# Patient Record
Sex: MALE | Race: BLACK OR AFRICAN AMERICAN | Employment: UNEMPLOYED | URBAN - METROPOLITAN AREA
[De-identification: names, ages, dates, MRNs, and addresses within clinical notes are randomized per-mention and may not be internally consistent; named-entity substitution may affect disease eponyms.]

---

## 2017-09-09 ENCOUNTER — HOSPITAL ENCOUNTER (EMERGENCY)
Age: 57
Discharge: HOME | End: 2017-09-09
Attending: EMERGENCY MEDICINE
Payer: MEDICARE

## 2017-09-09 ENCOUNTER — APPOINTMENT (OUTPATIENT)
Dept: GENERAL RADIOLOGY | Age: 57
End: 2017-09-09
Attending: EMERGENCY MEDICINE
Payer: MEDICARE

## 2017-09-09 VITALS
RESPIRATION RATE: 16 BRPM | OXYGEN SATURATION: 98 % | HEIGHT: 73 IN | DIASTOLIC BLOOD PRESSURE: 81 MMHG | TEMPERATURE: 98 F | BODY MASS INDEX: 23.59 KG/M2 | WEIGHT: 178 LBS | HEART RATE: 67 BPM | SYSTOLIC BLOOD PRESSURE: 132 MMHG

## 2017-09-09 DIAGNOSIS — L08.9 TOE INFECTION: Primary | ICD-10-CM

## 2017-09-09 LAB
ALBUMIN SERPL-MCNC: 3.4 G/DL (ref 3.5–5)
ALBUMIN/GLOB SERPL: 0.6 {RATIO} (ref 1.2–3.5)
ALP SERPL-CCNC: 100 U/L (ref 50–136)
ALT SERPL-CCNC: 16 U/L (ref 12–65)
ANION GAP SERPL CALC-SCNC: 7 MMOL/L (ref 7–16)
AST SERPL-CCNC: 23 U/L (ref 15–37)
BASOPHILS # BLD: 0 K/UL (ref 0–0.2)
BASOPHILS NFR BLD: 0 % (ref 0–2)
BILIRUB SERPL-MCNC: 0.6 MG/DL (ref 0.2–1.1)
BUN SERPL-MCNC: 12 MG/DL (ref 6–23)
CALCIUM SERPL-MCNC: 9.3 MG/DL (ref 8.3–10.4)
CHLORIDE SERPL-SCNC: 102 MMOL/L (ref 98–107)
CO2 SERPL-SCNC: 34 MMOL/L (ref 21–32)
CREAT SERPL-MCNC: 0.92 MG/DL (ref 0.8–1.5)
DIFFERENTIAL METHOD BLD: ABNORMAL
EOSINOPHIL # BLD: 0.1 K/UL (ref 0–0.8)
EOSINOPHIL NFR BLD: 1 % (ref 0.5–7.8)
ERYTHROCYTE [DISTWIDTH] IN BLOOD BY AUTOMATED COUNT: 14.7 % (ref 11.9–14.6)
GLOBULIN SER CALC-MCNC: 5.3 G/DL (ref 2.3–3.5)
GLUCOSE SERPL-MCNC: 109 MG/DL (ref 65–100)
HCT VFR BLD AUTO: 50.5 % (ref 41.1–50.3)
HGB BLD-MCNC: 17.1 G/DL (ref 13.6–17.2)
IMM GRANULOCYTES # BLD: 0 K/UL (ref 0–0.5)
IMM GRANULOCYTES NFR BLD: 0.2 % (ref 0–5)
LACTATE BLD-SCNC: 0.9 MMOL/L (ref 0.5–1.9)
LYMPHOCYTES # BLD: 1.9 K/UL (ref 0.5–4.6)
LYMPHOCYTES NFR BLD: 20 % (ref 13–44)
MCH RBC QN AUTO: 31.5 PG (ref 26.1–32.9)
MCHC RBC AUTO-ENTMCNC: 33.9 G/DL (ref 31.4–35)
MCV RBC AUTO: 93 FL (ref 79.6–97.8)
MONOCYTES # BLD: 0.8 K/UL (ref 0.1–1.3)
MONOCYTES NFR BLD: 8 % (ref 4–12)
NEUTS SEG # BLD: 6.8 K/UL (ref 1.7–8.2)
NEUTS SEG NFR BLD: 71 % (ref 43–78)
PLATELET # BLD AUTO: 277 K/UL (ref 150–450)
PMV BLD AUTO: 9.5 FL (ref 10.8–14.1)
POTASSIUM SERPL-SCNC: 4.2 MMOL/L (ref 3.5–5.1)
PROT SERPL-MCNC: 8.7 G/DL (ref 6.3–8.2)
RBC # BLD AUTO: 5.43 M/UL (ref 4.23–5.67)
SODIUM SERPL-SCNC: 143 MMOL/L (ref 136–145)
WBC # BLD AUTO: 9.6 K/UL (ref 4.3–11.1)

## 2017-09-09 PROCEDURE — 73660 X-RAY EXAM OF TOE(S): CPT

## 2017-09-09 PROCEDURE — 74011000258 HC RX REV CODE- 258: Performed by: EMERGENCY MEDICINE

## 2017-09-09 PROCEDURE — 74011250637 HC RX REV CODE- 250/637: Performed by: EMERGENCY MEDICINE

## 2017-09-09 PROCEDURE — 99284 EMERGENCY DEPT VISIT MOD MDM: CPT | Performed by: EMERGENCY MEDICINE

## 2017-09-09 PROCEDURE — 74011250636 HC RX REV CODE- 250/636: Performed by: EMERGENCY MEDICINE

## 2017-09-09 PROCEDURE — 74011000250 HC RX REV CODE- 250: Performed by: EMERGENCY MEDICINE

## 2017-09-09 PROCEDURE — 83605 ASSAY OF LACTIC ACID: CPT

## 2017-09-09 PROCEDURE — 80053 COMPREHEN METABOLIC PANEL: CPT | Performed by: EMERGENCY MEDICINE

## 2017-09-09 PROCEDURE — 85025 COMPLETE CBC W/AUTO DIFF WBC: CPT | Performed by: EMERGENCY MEDICINE

## 2017-09-09 PROCEDURE — 96365 THER/PROPH/DIAG IV INF INIT: CPT | Performed by: EMERGENCY MEDICINE

## 2017-09-09 RX ORDER — DICLOFENAC SODIUM 50 MG/1
50 TABLET, DELAYED RELEASE ORAL 2 TIMES DAILY
Qty: 14 TAB | Refills: 0 | Status: SHIPPED | OUTPATIENT
Start: 2017-09-09 | End: 2017-09-23

## 2017-09-09 RX ORDER — CLINDAMYCIN PHOSPHATE 600 MG/50ML
600 INJECTION INTRAVENOUS
Status: DISCONTINUED | OUTPATIENT
Start: 2017-09-09 | End: 2017-09-09 | Stop reason: SDUPTHER

## 2017-09-09 RX ORDER — HYDROCODONE BITARTRATE AND ACETAMINOPHEN 5; 325 MG/1; MG/1
1 TABLET ORAL ONCE
Status: COMPLETED | OUTPATIENT
Start: 2017-09-09 | End: 2017-09-09

## 2017-09-09 RX ORDER — CLINDAMYCIN HYDROCHLORIDE 150 MG/1
300 CAPSULE ORAL 3 TIMES DAILY
Qty: 42 CAP | Refills: 0 | Status: SHIPPED | OUTPATIENT
Start: 2017-09-09 | End: 2017-09-16

## 2017-09-09 RX ADMIN — SODIUM CHLORIDE 600 MG: 900 INJECTION, SOLUTION INTRAVENOUS at 12:32

## 2017-09-09 RX ADMIN — SODIUM CHLORIDE 1000 ML: 900 INJECTION, SOLUTION INTRAVENOUS at 12:31

## 2017-09-09 RX ADMIN — HYDROCODONE BITARTRATE AND ACETAMINOPHEN 1 TABLET: 5; 325 TABLET ORAL at 14:33

## 2017-09-09 NOTE — ED PROVIDER NOTES
HPI Comments: 50-year-old male with a history of pain in his left big toe that has gotten significantly worse over the last several days. Patient says it aches and is very uncomfortable. Patient says he has no known history of diabetes and does not take any medicines for anything. Patient says he did not have any specific injury to his toe. He denies any fevers or chills. Overall he rates his pain as an 8 out of 10. Elements of this note were created using speech recognition software. As such, errors of speech recognition may be present. Patient is a 55 y.o. male presenting with toe pain. The history is provided by the patient. Toe Pain           No past medical history on file. No past surgical history on file. No family history on file. Social History     Social History    Marital status: SINGLE     Spouse name: N/A    Number of children: N/A    Years of education: N/A     Occupational History    Not on file. Social History Main Topics    Smoking status: Not on file    Smokeless tobacco: Not on file    Alcohol use Not on file    Drug use: Not on file    Sexual activity: Not on file     Other Topics Concern    Not on file     Social History Narrative         ALLERGIES: Review of patient's allergies indicates no known allergies. Review of Systems   Constitutional: Negative for chills and fever. Respiratory: Negative. Cardiovascular: Negative. Gastrointestinal: Negative. Musculoskeletal: Positive for arthralgias and joint swelling. Skin: Positive for color change and wound. Vitals:    09/09/17 1156   BP: (!) 119/97   Pulse: (!) 116   Resp: 20   Temp: 97.7 °F (36.5 °C)   SpO2: 98%   Weight: 80.7 kg (178 lb)   Height: 6' 1\" (1.854 m)            Physical Exam   Constitutional: He is oriented to person, place, and time. He appears well-developed and well-nourished. Cardiovascular: Regular rhythm.     Mild tachycardia   Pulmonary/Chest: Effort normal and breath sounds normal.   Musculoskeletal:   Tenderness and swelling with erythema and induration to his left big toe   Neurological: He is alert and oriented to person, place, and time. Nursing note and vitals reviewed. MDM  Number of Diagnoses or Management Options  Diagnosis management comments: I attempted to drain a paronychia around his great toe. I did not get any significant pus release. Does have what appears to be some necrosis of the toe. Patient's blood work and x-ray were unremarkable. I will treat him with some antibiotics and encourage him to follow up with his primary care doctor he says is [facility].     ED Course       Procedures

## 2017-09-09 NOTE — ED NOTES
Patient reports swelling and pain to left great toe underside, patient reports this has gotten worse since last week and is having difficulty walking on his left foot. Also pain and warmth to the dorsal foot.

## 2017-09-09 NOTE — DISCHARGE INSTRUCTIONS
Return with any fevers, increased swelling, worsening symptoms, or additional concerns. Follow up with the wound care center for further evaluation.

## 2017-09-09 NOTE — ED TRIAGE NOTES
Patient reports left big toe pain x1 week. Under toe there is discoloration and swelling. Patient denies stepping on anything and no injury noted. Patient has increased sweating, but is afebrile. Patient also reports blisters on hands that have appeared since toe pain began.

## 2017-09-18 ENCOUNTER — APPOINTMENT (OUTPATIENT)
Dept: GENERAL RADIOLOGY | Age: 57
DRG: 580 | End: 2017-09-18
Attending: EMERGENCY MEDICINE
Payer: MEDICARE

## 2017-09-18 ENCOUNTER — HOSPITAL ENCOUNTER (INPATIENT)
Age: 57
LOS: 5 days | Discharge: HOME | DRG: 580 | End: 2017-09-23
Attending: EMERGENCY MEDICINE | Admitting: INTERNAL MEDICINE
Payer: MEDICARE

## 2017-09-18 DIAGNOSIS — L03.032 CELLULITIS OF TOE OF LEFT FOOT: Primary | ICD-10-CM

## 2017-09-18 PROBLEM — L08.9 INFECTION OF TOE: Status: ACTIVE | Noted: 2017-09-18

## 2017-09-18 LAB
ALBUMIN SERPL-MCNC: 3.3 G/DL (ref 3.5–5)
ALBUMIN/GLOB SERPL: 0.6 {RATIO} (ref 1.2–3.5)
ALP SERPL-CCNC: 114 U/L (ref 50–136)
ALT SERPL-CCNC: 15 U/L (ref 12–65)
ANION GAP SERPL CALC-SCNC: 10 MMOL/L (ref 7–16)
AST SERPL-CCNC: 11 U/L (ref 15–37)
BASOPHILS # BLD: 0 K/UL (ref 0–0.2)
BASOPHILS NFR BLD: 0 % (ref 0–2)
BILIRUB SERPL-MCNC: 0.6 MG/DL (ref 0.2–1.1)
BUN SERPL-MCNC: 12 MG/DL (ref 6–23)
CALCIUM SERPL-MCNC: 10 MG/DL (ref 8.3–10.4)
CHLORIDE SERPL-SCNC: 103 MMOL/L (ref 98–107)
CO2 SERPL-SCNC: 28 MMOL/L (ref 21–32)
CREAT SERPL-MCNC: 0.89 MG/DL (ref 0.8–1.5)
DIFFERENTIAL METHOD BLD: ABNORMAL
EOSINOPHIL # BLD: 0.1 K/UL (ref 0–0.8)
EOSINOPHIL NFR BLD: 1 % (ref 0.5–7.8)
ERYTHROCYTE [DISTWIDTH] IN BLOOD BY AUTOMATED COUNT: 14 % (ref 11.9–14.6)
GLOBULIN SER CALC-MCNC: 5.7 G/DL (ref 2.3–3.5)
GLUCOSE SERPL-MCNC: 86 MG/DL (ref 65–100)
HCT VFR BLD AUTO: 48.1 % (ref 41.1–50.3)
HGB BLD-MCNC: 16.9 G/DL (ref 13.6–17.2)
IMM GRANULOCYTES # BLD: 0 K/UL (ref 0–0.5)
IMM GRANULOCYTES NFR BLD: 0.2 % (ref 0–5)
LACTATE BLD-SCNC: 1.1 MMOL/L (ref 0.5–1.9)
LYMPHOCYTES # BLD: 1.4 K/UL (ref 0.5–4.6)
LYMPHOCYTES NFR BLD: 16 % (ref 13–44)
MAGNESIUM SERPL-MCNC: 2.1 MG/DL (ref 1.8–2.4)
MCH RBC QN AUTO: 31.1 PG (ref 26.1–32.9)
MCHC RBC AUTO-ENTMCNC: 35.1 G/DL (ref 31.4–35)
MCV RBC AUTO: 88.6 FL (ref 79.6–97.8)
MONOCYTES # BLD: 0.6 K/UL (ref 0.1–1.3)
MONOCYTES NFR BLD: 7 % (ref 4–12)
NEUTS SEG # BLD: 6.6 K/UL (ref 1.7–8.2)
NEUTS SEG NFR BLD: 76 % (ref 43–78)
PLATELET # BLD AUTO: 341 K/UL (ref 150–450)
PMV BLD AUTO: 9.3 FL (ref 10.8–14.1)
POTASSIUM SERPL-SCNC: 3.9 MMOL/L (ref 3.5–5.1)
PROCALCITONIN SERPL-MCNC: 0.1 NG/ML
PROT SERPL-MCNC: 9 G/DL (ref 6.3–8.2)
RBC # BLD AUTO: 5.43 M/UL (ref 4.23–5.67)
SODIUM SERPL-SCNC: 141 MMOL/L (ref 136–145)
WBC # BLD AUTO: 8.8 K/UL (ref 4.3–11.1)

## 2017-09-18 PROCEDURE — 96374 THER/PROPH/DIAG INJ IV PUSH: CPT | Performed by: EMERGENCY MEDICINE

## 2017-09-18 PROCEDURE — 73660 X-RAY EXAM OF TOE(S): CPT

## 2017-09-18 PROCEDURE — 65270000029 HC RM PRIVATE

## 2017-09-18 PROCEDURE — 99284 EMERGENCY DEPT VISIT MOD MDM: CPT | Performed by: EMERGENCY MEDICINE

## 2017-09-18 PROCEDURE — 74011250637 HC RX REV CODE- 250/637: Performed by: INTERNAL MEDICINE

## 2017-09-18 PROCEDURE — 74011000258 HC RX REV CODE- 258: Performed by: INTERNAL MEDICINE

## 2017-09-18 PROCEDURE — 74011000302 HC RX REV CODE- 302: Performed by: INTERNAL MEDICINE

## 2017-09-18 PROCEDURE — 74011250636 HC RX REV CODE- 250/636: Performed by: INTERNAL MEDICINE

## 2017-09-18 PROCEDURE — 85025 COMPLETE CBC W/AUTO DIFF WBC: CPT | Performed by: EMERGENCY MEDICINE

## 2017-09-18 PROCEDURE — 84145 PROCALCITONIN (PCT): CPT | Performed by: EMERGENCY MEDICINE

## 2017-09-18 PROCEDURE — 74011250636 HC RX REV CODE- 250/636: Performed by: EMERGENCY MEDICINE

## 2017-09-18 PROCEDURE — 86580 TB INTRADERMAL TEST: CPT | Performed by: INTERNAL MEDICINE

## 2017-09-18 PROCEDURE — 87040 BLOOD CULTURE FOR BACTERIA: CPT | Performed by: EMERGENCY MEDICINE

## 2017-09-18 PROCEDURE — 83605 ASSAY OF LACTIC ACID: CPT

## 2017-09-18 PROCEDURE — 83735 ASSAY OF MAGNESIUM: CPT | Performed by: EMERGENCY MEDICINE

## 2017-09-18 PROCEDURE — 80053 COMPREHEN METABOLIC PANEL: CPT | Performed by: EMERGENCY MEDICINE

## 2017-09-18 PROCEDURE — 96375 TX/PRO/DX INJ NEW DRUG ADDON: CPT | Performed by: EMERGENCY MEDICINE

## 2017-09-18 RX ORDER — NALOXONE HYDROCHLORIDE 0.4 MG/ML
0.4 INJECTION, SOLUTION INTRAMUSCULAR; INTRAVENOUS; SUBCUTANEOUS AS NEEDED
Status: DISCONTINUED | OUTPATIENT
Start: 2017-09-18 | End: 2017-09-23 | Stop reason: HOSPADM

## 2017-09-18 RX ORDER — ACETAMINOPHEN 325 MG/1
650 TABLET ORAL
Status: DISCONTINUED | OUTPATIENT
Start: 2017-09-18 | End: 2017-09-23 | Stop reason: HOSPADM

## 2017-09-18 RX ORDER — VANCOMYCIN HYDROCHLORIDE
1250 EVERY 12 HOURS
Status: DISCONTINUED | OUTPATIENT
Start: 2017-09-19 | End: 2017-09-23

## 2017-09-18 RX ORDER — ENOXAPARIN SODIUM 100 MG/ML
40 INJECTION SUBCUTANEOUS EVERY 24 HOURS
Status: DISCONTINUED | OUTPATIENT
Start: 2017-09-18 | End: 2017-09-18 | Stop reason: SDUPTHER

## 2017-09-18 RX ORDER — HYDROCODONE BITARTRATE AND ACETAMINOPHEN 7.5; 325 MG/1; MG/1
1 TABLET ORAL
Status: DISCONTINUED | OUTPATIENT
Start: 2017-09-18 | End: 2017-09-23

## 2017-09-18 RX ORDER — ONDANSETRON 2 MG/ML
4 INJECTION INTRAMUSCULAR; INTRAVENOUS
Status: DISCONTINUED | OUTPATIENT
Start: 2017-09-18 | End: 2017-09-23 | Stop reason: HOSPADM

## 2017-09-18 RX ORDER — BISACODYL 5 MG
5 TABLET, DELAYED RELEASE (ENTERIC COATED) ORAL DAILY PRN
Status: DISCONTINUED | OUTPATIENT
Start: 2017-09-18 | End: 2017-09-23 | Stop reason: HOSPADM

## 2017-09-18 RX ORDER — DIPHENHYDRAMINE HYDROCHLORIDE 50 MG/ML
12.5 INJECTION, SOLUTION INTRAMUSCULAR; INTRAVENOUS
Status: DISCONTINUED | OUTPATIENT
Start: 2017-09-18 | End: 2017-09-23 | Stop reason: HOSPADM

## 2017-09-18 RX ORDER — SODIUM CHLORIDE 0.9 % (FLUSH) 0.9 %
5-10 SYRINGE (ML) INJECTION AS NEEDED
Status: DISCONTINUED | OUTPATIENT
Start: 2017-09-18 | End: 2017-09-23 | Stop reason: HOSPADM

## 2017-09-18 RX ORDER — NICOTINE 7MG/24HR
1 PATCH, TRANSDERMAL 24 HOURS TRANSDERMAL EVERY 24 HOURS
Status: DISCONTINUED | OUTPATIENT
Start: 2017-09-18 | End: 2017-09-20

## 2017-09-18 RX ORDER — VANCOMYCIN HYDROCHLORIDE
1250 ONCE
Status: COMPLETED | OUTPATIENT
Start: 2017-09-18 | End: 2017-09-18

## 2017-09-18 RX ORDER — HYDROMORPHONE HYDROCHLORIDE 1 MG/ML
1 INJECTION, SOLUTION INTRAMUSCULAR; INTRAVENOUS; SUBCUTANEOUS
Status: DISCONTINUED | OUTPATIENT
Start: 2017-09-18 | End: 2017-09-18 | Stop reason: SDUPTHER

## 2017-09-18 RX ORDER — SODIUM CHLORIDE 0.9 % (FLUSH) 0.9 %
5-10 SYRINGE (ML) INJECTION EVERY 8 HOURS
Status: DISCONTINUED | OUTPATIENT
Start: 2017-09-18 | End: 2017-09-23 | Stop reason: HOSPADM

## 2017-09-18 RX ORDER — HYDROMORPHONE HYDROCHLORIDE 1 MG/ML
1 INJECTION, SOLUTION INTRAMUSCULAR; INTRAVENOUS; SUBCUTANEOUS
Status: DISCONTINUED | OUTPATIENT
Start: 2017-09-18 | End: 2017-09-21

## 2017-09-18 RX ORDER — ENOXAPARIN SODIUM 100 MG/ML
40 INJECTION SUBCUTANEOUS EVERY 24 HOURS
Status: DISCONTINUED | OUTPATIENT
Start: 2017-09-18 | End: 2017-09-23 | Stop reason: HOSPADM

## 2017-09-18 RX ADMIN — HYDROMORPHONE HYDROCHLORIDE 1 MG: 1 INJECTION, SOLUTION INTRAMUSCULAR; INTRAVENOUS; SUBCUTANEOUS at 19:37

## 2017-09-18 RX ADMIN — HYDROCODONE BITARTRATE AND ACETAMINOPHEN 1 TABLET: 7.5; 325 TABLET ORAL at 22:22

## 2017-09-18 RX ADMIN — Medication 10 ML: at 22:03

## 2017-09-18 RX ADMIN — PIPERACILLIN SODIUM,TAZOBACTAM SODIUM 3.38 G: 3; .375 INJECTION, POWDER, FOR SOLUTION INTRAVENOUS at 22:47

## 2017-09-18 RX ADMIN — TUBERCULIN PURIFIED PROTEIN DERIVATIVE 5 UNITS: 5 INJECTION INTRADERMAL at 22:19

## 2017-09-18 RX ADMIN — VANCOMYCIN HYDROCHLORIDE 1250 MG: 10 INJECTION, POWDER, LYOPHILIZED, FOR SOLUTION INTRAVENOUS at 16:44

## 2017-09-18 RX ADMIN — ENOXAPARIN SODIUM 40 MG: 40 INJECTION SUBCUTANEOUS at 22:02

## 2017-09-18 RX ADMIN — HYDROMORPHONE HYDROCHLORIDE 1 MG: 1 INJECTION, SOLUTION INTRAMUSCULAR; INTRAVENOUS; SUBCUTANEOUS at 15:59

## 2017-09-18 NOTE — IP AVS SNAPSHOT
Summary of Care Report The Summary of Care report has been created to help improve care coordination. Users with access to [employer] or [address] (Web-based application) may access additional patient information including the Discharge Summary. If you are not currently a [address] user and need more information, please call the number listed below in the [place] [address] section and ask to be connected with Medical Records. Facility Information Name Address Phone [address] [facility] [address] [phone] Patient Information Patient Name Sex  Hiram Palmer ([id number]) Male 1960 Discharge Information Admitting Provider Service Area Unit Etienne Chavez MD / [address] Med Surg / [phone] Discharge Provider Discharge Date/Time Discharge Disposition Destination (none) 2017  1:07 PM (Pending) AHR (none) Patient Language Language ENGLISH [13] Hospital Problems as of 2017  Never Reviewed Class Noted - Resolved Last Modified POA Active Problems Infection of toe  2017 - Present 2017 by Davie Dennis NP Unknown Entered by Etienne Chavez MD  
  Overview Signed 2017 12:02 PM by Davie Dennis NP  
   17 (Dr Santy Minor) [address] You are allergic to the following No active allergies Current Discharge Medication List  
  
START taking these medications Dose & Instructions Dispensing Information Comments  
 amLODIPine 5 mg tablet Commonly known as:  Russell Tillman Dose:  5 mg Take 1 Tab by mouth daily. Quantity:  30 Tab Refills:  0  
   
 amoxicillin-clavulanate 875-125 mg per tablet Commonly known as:  AUGMENTIN Dose:  1 Tab Take 1 Tab by mouth every twelve (12) hours for 10 days. Quantity:  20 Tab Refills:  0  
   
 oxyCODONE ER 10 mg ER tablet Commonly known as:  OxyCONTIN Dose:  10 mg Take 1 Tab by mouth every twelve (12) hours. Max Daily Amount: 20 mg.  
 Quantity:  10 Tab Refills:  0  
   
 trimethoprim-sulfamethoxazole 160-800 mg per tablet Commonly known as:  BACTRIM DS, SEPTRA DS Dose:  1 Tab Take 1 Tab by mouth every twelve (12) hours for 10 days. Quantity:  20 Tab Refills:  0 STOP taking these medications Comments  
 diclofenac EC 50 mg EC tablet Commonly known as:  VOLTAREN Current Immunizations Name Date  
 TB Skin Test (PPD) Intradermal 9/18/2017 Surgery Information ID Date/Time Status Primary Surgeon All Procedures Location [id number] 9/20/2017  7:30 AM Posted Gretta Downing MD LEFT 1ST TOE AMPUTATION/ CHOICE ANES / ROOM [facility] MAIN OR    
 [id number] 9/22/2017  3:50 PM Unposted Gretta Downing MD LEFT FIRST TOE AMPUTATION REVISION AND CLOSURE  D MAIN OR Follow-up Information Follow up With Details Comments Contact Info Gretta Downing MD Schedule an appointment as soon as possible for a visit in 1 week  [facility] [address] [place] [address] 
[phone] Discharge Instructions None Chart Review Routing History Recipient Method Report Sent By Cathryn Alanis MD  
Fax: [phone] Phone: [phone] Fax IP Auto Routed Joni Tanner MD [[id number]] 9/21/2017 10:59 AM 09/21/2017 Gretta Downing MD  
Phone: [phone] In [employer] Routed Joni Tanner MD [[id number]] 9/21/2017 10:59 AM 09/21/2017

## 2017-09-18 NOTE — H&P
HOSPITALIST H&P/CONSULT  NAME:  Lon Malloy   Age:  55 y.o.  :   1960   MRN:   [id number]  PCP: None  Consulting MD:  Treatment Team: Primary Nurse: Darrell Jo RN  HPI:   50yo M with PMH of cigarette smoking and HTN presented to the ER with worsening pain and swelling of Left great toe. Pt was seen in ER last week for an abscess which was I&D in ER and he was discharged on Clindamycin. Pt states he has been taking meds but symptoms didn't improve so he came to ER. Reports this first started as a blister on his toe one month ago. Denies history of DM and doesn't report claudication either but states his foot becomes numb when he stands up. X Ray in ER showed gas formation in Left great Toe. He was startred on Vanc/Zosyn, hospitalist asked to admit. Pt states he feels better after pain meds. Denies any fever, chills, Rash, cough, abd pain or CP. Pain in his left great toe is constant and becomes intense on movement. 10 point ROS done and is negative except as noted in HPI. Past Medical History:   Diagnosis Date    Hypertension       History reviewed. No pertinent surgical history. Prior to Admission Medications   Prescriptions Last Dose Informant Patient Reported? Taking?   diclofenac EC (VOLTAREN) 50 mg EC tablet   No No   Sig: Take 1 Tab by mouth two (2) times a day. Facility-Administered Medications: None     Home meds reconciled. No Known Allergies   Social History   Substance Use Topics    Smoking status: Current Every Day Smoker    Smokeless tobacco: Never Used    Alcohol use Yes      History reviewed. No pertinent family history. There is no immunization history for the selected administration types on file for this patient.   Objective:     Visit Vitals    /85    Pulse 69    Temp 97.4 °F (36.3 °C)    Resp 24    Ht 6' 1\" (1.854 m)    Wt 80.7 kg (178 lb)    SpO2 100%    BMI 23.48 kg/m2      Temp (24hrs), Av.4 °F (36.3 °C), Min:97.4 °F (36.3 °C), Max:97.4 °F (36.3 °C)    Oxygen Therapy  O2 Sat (%): 100 % (09/18/17 1639)  Pulse via Oximetry: 69 beats per minute (09/18/17 1639)  O2 Device: Room air (09/18/17 1136)  Physical Exam:  General:    Alert, cooperative, no distress   Head:   NCAT. No obvious deformity  Nose:  Nares normal. No drainage  Lungs:   CTABL. No wheezing/rhonchi/rales  Heart:   RRR. No m/r/g. Abdomen:   S/nt/nd. Bowel sounds normal.   Extremities: Left great Toe is gangrenous, warm and surrounding inflammation. Skin:     No rashes or lesions. Not Jaundiced  Neurologic: Moves all extremities. no gross focal deficits      Data Review:   Recent Results (from the past 24 hour(s))   CBC WITH AUTOMATED DIFF    Collection Time: 09/18/17  3:10 PM   Result Value Ref Range    WBC 8.8 4.3 - 11.1 K/uL    RBC 5.43 4.23 - 5.67 M/uL    HGB 16.9 13.6 - 17.2 g/dL    HCT 48.1 41.1 - 50.3 %    MCV 88.6 79.6 - 97.8 FL    MCH 31.1 26.1 - 32.9 PG    MCHC 35.1 (H) 31.4 - 35.0 g/dL    RDW 14.0 11.9 - 14.6 %    PLATELET 314 713 - 714 K/uL    MPV 9.3 (L) 10.8 - 14.1 FL    DF AUTOMATED      NEUTROPHILS 76 43 - 78 %    LYMPHOCYTES 16 13 - 44 %    MONOCYTES 7 4.0 - 12.0 %    EOSINOPHILS 1 0.5 - 7.8 %    BASOPHILS 0 0.0 - 2.0 %    IMMATURE GRANULOCYTES 0.2 0.0 - 5.0 %    ABS. NEUTROPHILS 6.6 1.7 - 8.2 K/UL    ABS. LYMPHOCYTES 1.4 0.5 - 4.6 K/UL    ABS. MONOCYTES 0.6 0.1 - 1.3 K/UL    ABS. EOSINOPHILS 0.1 0.0 - 0.8 K/UL    ABS. BASOPHILS 0.0 0.0 - 0.2 K/UL    ABS. IMM.  GRANS. 0.0 0.0 - 0.5 K/UL   METABOLIC PANEL, COMPREHENSIVE    Collection Time: 09/18/17  3:10 PM   Result Value Ref Range    Sodium 141 136 - 145 mmol/L    Potassium 3.9 3.5 - 5.1 mmol/L    Chloride 103 98 - 107 mmol/L    CO2 28 21 - 32 mmol/L    Anion gap 10 7 - 16 mmol/L    Glucose 86 65 - 100 mg/dL    BUN 12 6 - 23 MG/DL    Creatinine 0.89 0.8 - 1.5 MG/DL    GFR est AA >60 >60 ml/min/1.73m2    GFR est non-AA >60 >60 ml/min/1.73m2    Calcium 10.0 8.3 - 10.4 MG/DL    Bilirubin, total 0.6 0.2 - 1.1 MG/DL ALT (SGPT) 15 12 - 65 U/L    AST (SGOT) 11 (L) 15 - 37 U/L    Alk. phosphatase 114 50 - 136 U/L    Protein, total 9.0 (H) 6.3 - 8.2 g/dL    Albumin 3.3 (L) 3.5 - 5.0 g/dL    Globulin 5.7 (H) 2.3 - 3.5 g/dL    A-G Ratio 0.6 (L) 1.2 - 3.5     PROCALCITONIN    Collection Time: 09/18/17  3:10 PM   Result Value Ref Range    Procalcitonin 0.1 ng/mL   MAGNESIUM    Collection Time: 09/18/17  3:10 PM   Result Value Ref Range    Magnesium 2.1 1.8 - 2.4 mg/dL   POC LACTIC ACID    Collection Time: 09/18/17  3:28 PM   Result Value Ref Range    Lactic Acid (POC) 1.1 0.5 - 1.9 mmol/L     Imaging /Procedures /Studies:  I personally reviewed all labs, imaging, and other studies this admission:  CXR Results  (Last 48 hours)    None        CT Results  (Last 48 hours)    None          Assessment and Plan: Active Hospital Problems    Diagnosis Date Noted    Infection of toe 09/18/2017       PLAN  · Admit to inpt medical floor  · Start on Vanc/Zosyn. Follow blood Cx  · Get vascular surgery eval, get STEVEN. He might need toe amputation.   · Pain meds, IV fluids  · Nicotine patch    FEN:  Cardiac diet  DVT ppx:  lovenox  Code status:  full  Estimated LOS: 3-4 days     Plan of care discussed with: patient     Signed By: Norm Banuelos MD     September 18, 2017

## 2017-09-18 NOTE — PROGRESS NOTES
TRANSFER - IN REPORT:    Verbal report received from Federico(name) on Celester Jimenes  being received from ED(unit) for routine progression of care      Report consisted of patients Situation, Background, Assessment and   Recommendations(SBAR). Information from the following report(s) SBAR was reviewed with the receiving nurse. Opportunity for questions and clarification was provided. Assessment completed upon patients arrival to unit and care assumed.

## 2017-09-18 NOTE — PROGRESS NOTES
Pharmacokinetic Consult to Pharmacist    Jose J Marvin is a 55 y.o. male being treated for sepsis with vancomycin and zosyn. Height: 6' 1\" (185.4 cm)  Weight: 80.7 kg (178 lb)  Lab Results   Component Value Date/Time    BUN 12 09/18/2017 03:10 PM    Creatinine 0.89 09/18/2017 03:10 PM    WBC 8.8 09/18/2017 03:10 PM    Procalcitonin 0.1 09/18/2017 03:10 PM    Lactic Acid (POC) 1.1 09/18/2017 03:28 PM      Estimated Creatinine Clearance: 106 mL/min (based on Cr of 0.89). CULTURES:  9/18: BCx - in process    Day 1 of vancomycin. Goal trough is 15-20. Vancomycin dose initiated at 1.25g X1, then 1.25g q12h. Check trough prior to 4th dose   Will continue to follow patient.       Thank you,  Nasreen Lopez, PharmD, BCPS  Clinical Pharmacist  [phone]

## 2017-09-18 NOTE — ED PROVIDER NOTES
HPI Comments: Complains of worsening redness and pain on L great toe. Patient was here on the ninth with abscess to his toe which was I and D'd and he was started on clindamycin. He is taking his antibiotics and pain medicine is not helping. He states the redness and swelling has increased. It is now draining. He is a smoker but has no other medical problems that he knows of. Patient is a 55 y.o. male presenting with toe pain. The history is provided by the patient. Toe Pain    This is a new problem. The current episode started more than 1 week ago. The problem occurs constantly. The problem has been gradually worsening. The pain is present in the right toes. The quality of the pain is described as aching and pounding. The pain is moderate. Associated symptoms include limited range of motion and stiffness. Pertinent negatives include no numbness. The symptoms are aggravated by standing, contact, movement and palpation. Treatments tried: antibiotics. The treatment provided no relief. There has been no history of extremity trauma. Past Medical History:   Diagnosis Date    Hypertension        History reviewed. No pertinent surgical history. History reviewed. No pertinent family history. Social History     Social History    Marital status: SINGLE     Spouse name: N/A    Number of children: N/A    Years of education: N/A     Occupational History    Not on file. Social History Main Topics    Smoking status: Current Every Day Smoker    Smokeless tobacco: Never Used    Alcohol use Yes    Drug use: Yes     Special: Marijuana    Sexual activity: Not on file     Other Topics Concern    Not on file     Social History Narrative    No narrative on file         ALLERGIES: Review of patient's allergies indicates no known allergies. Review of Systems   Constitutional: Negative for chills and fever. Musculoskeletal: Positive for stiffness. Skin: Positive for color change and wound. Neurological: Negative for numbness. All other systems reviewed and are negative. Vitals:    09/18/17 1136   BP: (!) 159/115   Pulse: 84   Resp: 24   Temp: 97.4 °F (36.3 °C)   SpO2: 97%   Weight: 80.7 kg (178 lb)   Height: 6' 1\" (1.854 m)            Physical Exam   Constitutional: He is oriented to person, place, and time. He appears well-developed and well-nourished. No distress. HENT:   Head: Normocephalic and atraumatic. Cardiovascular: Normal rate and regular rhythm. Pulmonary/Chest: Effort normal and breath sounds normal. No respiratory distress. He has no wheezes. He has no rales. Abdominal: Soft. He exhibits no distension. There is no tenderness. There is no rebound and no guarding. Musculoskeletal: He exhibits edema and tenderness. The pad of his toe is black and he has significant erythema and edema to  Mid foot. Foot is warm and he has a normal posterior tibial pulse. Neurological: He is alert and oriented to person, place, and time. No cranial nerve deficit. Skin: Skin is warm and dry. He is not diaphoretic. Nursing note and vitals reviewed. MDM  Number of Diagnoses or Management Options  Cellulitis of toe of left foot:   Diagnosis management comments: Cellulitis failed outpatient treatment will need admission. Started on IV Vanco and Zosyn.         Amount and/or Complexity of Data Reviewed  Clinical lab tests: ordered and reviewed  Review and summarize past medical records: yes  Discuss the patient with other providers: yes    Risk of Complications, Morbidity, and/or Mortality  Presenting problems: high  Diagnostic procedures: moderate  Management options: high    Patient Progress  Patient progress: improved    ED Course       Procedures

## 2017-09-18 NOTE — IP AVS SNAPSHOT
Tiffani Mckinnon 
 
 
 [address] [facility] [address] [facility] 
[phone] Patient: Jose J Wong MRN: [id number] :1960 You are allergic to the following No active allergies Immunizations Administered for This Admission Name Date  
 TB Skin Test (PPD) Intradermal 2017 Recent Documentation Height Weight BMI Smoking Status 1.854 m 80.7 kg 23.48 kg/m2 Current Every Day Smoker Emergency Contacts Name Discharge Info Relation Home Work Mobile [employer]  Life Partner [7] [phone] About your hospitalization You were admitted on:  2017 You last received care in the:  [facility] 6 MED SURG You were discharged on:  2017 Unit phone number:  [phone] Why you were hospitalized Your primary diagnosis was:  Not on File Your diagnoses also included:  Infection Of Toe Providers Seen During Your Hospitalizations Provider Role Specialty Primary office phone Lissa David MD Attending Provider Emergency Medicine [phone] Rishabh Choe MD Attending Provider Internal Medicine [phone] Your Primary Care Physician (PCP) Primary Care Physician Office Phone Office Fax NONE ** None ** ** None ** Follow-up Information Follow up With Details Comments Contact Info Lauren Dye MD Schedule an appointment as soon as possible for a visit in 1 week  [facility] [address] [facility] [address] [phone] Current Discharge Medication List  
  
START taking these medications Dose & Instructions Dispensing Information Comments Morning Noon Evening Bedtime  
 amLODIPine 5 mg tablet Commonly known as:  Kareem Cassidy Your last dose was: Your next dose is:    
   
   
 Dose:  5 mg Take 1 Tab by mouth daily. Quantity:  30 Tab Refills:  0 amoxicillin-clavulanate 875-125 mg per tablet Commonly known as:  AUGMENTIN Your last dose was: Your next dose is:    
   
   
 Dose:  1 Tab Take 1 Tab by mouth every twelve (12) hours for 10 days. Quantity:  20 Tab Refills:  0  
     
   
   
   
  
 oxyCODONE ER 10 mg ER tablet Commonly known as:  OxyCONTIN Your last dose was: Your next dose is:    
   
   
 Dose:  10 mg Take 1 Tab by mouth every twelve (12) hours. Max Daily Amount: 20 mg.  
 Quantity:  10 Tab Refills:  0  
     
   
   
   
  
 trimethoprim-sulfamethoxazole 160-800 mg per tablet Commonly known as:  BACTRIM DS, SEPTRA DS Your last dose was: Your next dose is:    
   
   
 Dose:  1 Tab Take 1 Tab by mouth every twelve (12) hours for 10 days. Quantity:  20 Tab Refills:  0 STOP taking these medications   
 diclofenac EC 50 mg EC tablet Commonly known as:  VOLTAREN Where to Get Your Medications Information on where to get these meds will be given to you by the nurse or doctor. ! Ask your nurse or doctor about these medications  
  amLODIPine 5 mg tablet  
 amoxicillin-clavulanate 875-125 mg per tablet  
 oxyCODONE ER 10 mg ER tablet  
 trimethoprim-sulfamethoxazole 160-800 mg per tablet Discharge Instructions None Discharge Orders None Introducing [facility]! Ike Saucedo introduces rumr: turn off the lights patient portal. Now you can access parts of your medical record, email your doctor's office, and request medication refills online. 1. In your internet browser, go to [email or web address] 2. Click on the First Time User? Click Here link in the Sign In box. You will see the New Member Sign Up page. 3. Enter your rumr: turn off the lights Access Code exactly as it appears below. You will not need to use this code after youve completed the sign-up process.  If you do not sign up before the expiration date, you must request a new code. · [employer] Access Code: [email or web address] Expires: 12/8/2017  3:19 PM 
 
4. Enter the last four digits of your Social Security Number (xxxx) and Date of Birth (mm/dd/yyyy) as indicated and click Submit. You will be taken to the next sign-up page. 5. Create a [employer] ID. This will be your [employer] login ID and cannot be changed, so think of one that is secure and easy to remember. 6. Create a [employer] password. You can change your password at any time. 7. Enter your Password Reset Question and Answer. This can be used at a later time if you forget your password. 8. Enter your e-mail address. You will receive e-mail notification when new information is available in [address] 9. Click Sign Up. You can now view and download portions of your medical record. 10. Click the Download Summary menu link to download a portable copy of your medical information. If you have questions, please visit the Frequently Asked Questions section of the [employer] website. Remember, [employer] is NOT to be used for urgent needs. For medical emergencies, dial 911. Now available from your iPhone and Android! General Information Please provide this summary of care documentation to your next provider. Patient Signature:  ____________________________________________________________ Date:  ____________________________________________________________  
  
Arna Yamile Provider Signature:  ____________________________________________________________ Date:  ____________________________________________________________

## 2017-09-18 NOTE — ED TRIAGE NOTES
Pt states he was here last week and had an abscess on his toe on the right foot drained. Pt was sent home with antibiotics and pain meds. Pt states he has taken almost half of his antibiotics and the pain meds are not working.

## 2017-09-18 NOTE — ED NOTES
TRANSFER - OUT REPORT:    Verbal report given to [facility] RN on Rosa Elena Dave  being transferred to [facility] for routine progression of care       Report consisted of patients Situation, Background, Assessment and   Recommendations(SBAR). Information from the following report(s) ED Summary was reviewed with the receiving nurse. Lines:   Peripheral IV 09/18/17 Left Antecubital (Active)   Site Assessment Clean, dry, & intact 9/18/2017  3:08 PM   Phlebitis Assessment 0 9/18/2017  3:08 PM   Infiltration Assessment 0 9/18/2017  3:08 PM        Opportunity for questions and clarification was provided.       Patient transported with:   [place]

## 2017-09-19 ENCOUNTER — APPOINTMENT (OUTPATIENT)
Dept: ULTRASOUND IMAGING | Age: 57
DRG: 580 | End: 2017-09-19
Attending: INTERNAL MEDICINE
Payer: MEDICARE

## 2017-09-19 ENCOUNTER — ANESTHESIA EVENT (OUTPATIENT)
Dept: SURGERY | Age: 57
DRG: 580 | End: 2017-09-19
Payer: MEDICARE

## 2017-09-19 LAB
HIV1 P24 AG SERPL QL IA: NONREACTIVE
HIV1+2 AB SERPL QL IA: NONREACTIVE

## 2017-09-19 PROCEDURE — 80074 ACUTE HEPATITIS PANEL: CPT | Performed by: INTERNAL MEDICINE

## 2017-09-19 PROCEDURE — 74011250636 HC RX REV CODE- 250/636: Performed by: EMERGENCY MEDICINE

## 2017-09-19 PROCEDURE — 74011250636 HC RX REV CODE- 250/636: Performed by: INTERNAL MEDICINE

## 2017-09-19 PROCEDURE — 93922 UPR/L XTREMITY ART 2 LEVELS: CPT

## 2017-09-19 PROCEDURE — 65270000029 HC RM PRIVATE

## 2017-09-19 PROCEDURE — 87389 HIV-1 AG W/HIV-1&-2 AB AG IA: CPT | Performed by: INTERNAL MEDICINE

## 2017-09-19 PROCEDURE — 74011000258 HC RX REV CODE- 258: Performed by: INTERNAL MEDICINE

## 2017-09-19 PROCEDURE — 74011250637 HC RX REV CODE- 250/637: Performed by: INTERNAL MEDICINE

## 2017-09-19 PROCEDURE — 36415 COLL VENOUS BLD VENIPUNCTURE: CPT | Performed by: INTERNAL MEDICINE

## 2017-09-19 RX ORDER — ENALAPRILAT 1.25 MG/ML
0.62 INJECTION INTRAVENOUS
Status: DISCONTINUED | OUTPATIENT
Start: 2017-09-19 | End: 2017-09-23 | Stop reason: HOSPADM

## 2017-09-19 RX ADMIN — VANCOMYCIN HYDROCHLORIDE 1250 MG: 10 INJECTION, POWDER, LYOPHILIZED, FOR SOLUTION INTRAVENOUS at 03:42

## 2017-09-19 RX ADMIN — PIPERACILLIN SODIUM,TAZOBACTAM SODIUM 3.38 G: 3; .375 INJECTION, POWDER, FOR SOLUTION INTRAVENOUS at 06:32

## 2017-09-19 RX ADMIN — VANCOMYCIN HYDROCHLORIDE 1250 MG: 10 INJECTION, POWDER, LYOPHILIZED, FOR SOLUTION INTRAVENOUS at 16:25

## 2017-09-19 RX ADMIN — HYDROMORPHONE HYDROCHLORIDE 1 MG: 1 INJECTION, SOLUTION INTRAMUSCULAR; INTRAVENOUS; SUBCUTANEOUS at 18:42

## 2017-09-19 RX ADMIN — HYDROMORPHONE HYDROCHLORIDE 1 MG: 1 INJECTION, SOLUTION INTRAMUSCULAR; INTRAVENOUS; SUBCUTANEOUS at 14:11

## 2017-09-19 RX ADMIN — PIPERACILLIN SODIUM,TAZOBACTAM SODIUM 3.38 G: 3; .375 INJECTION, POWDER, FOR SOLUTION INTRAVENOUS at 22:34

## 2017-09-19 RX ADMIN — HYDROMORPHONE HYDROCHLORIDE 1 MG: 1 INJECTION, SOLUTION INTRAMUSCULAR; INTRAVENOUS; SUBCUTANEOUS at 22:34

## 2017-09-19 RX ADMIN — Medication 10 ML: at 05:26

## 2017-09-19 RX ADMIN — PIPERACILLIN SODIUM,TAZOBACTAM SODIUM 3.38 G: 3; .375 INJECTION, POWDER, FOR SOLUTION INTRAVENOUS at 14:18

## 2017-09-19 RX ADMIN — Medication 10 ML: at 22:47

## 2017-09-19 RX ADMIN — Medication 10 ML: at 14:15

## 2017-09-19 RX ADMIN — HYDROCODONE BITARTRATE AND ACETAMINOPHEN 1 TABLET: 7.5; 325 TABLET ORAL at 10:30

## 2017-09-19 RX ADMIN — HYDROMORPHONE HYDROCHLORIDE 1 MG: 1 INJECTION, SOLUTION INTRAMUSCULAR; INTRAVENOUS; SUBCUTANEOUS at 07:33

## 2017-09-19 NOTE — PROGRESS NOTES
Hourly rounds were done and pt needs were met. Report will be given to day shift nurse and will continue to monitor.

## 2017-09-19 NOTE — PROGRESS NOTES
There was a dual skin assessment with stephanie LI and the pt skin is intact. His left big toes is black.

## 2017-09-19 NOTE — PROGRESS NOTES
Hospitalist Progress Note     Admit Date:  2017  1:09 PM   Name:  Maine Neri   Age:  55 y.o.  :  1960   MRN:  [id number]   PCP:  None  Treatment Team: Attending Provider: Mykel De La Rosa MD; Consulting Provider: Levi Card MD; Utilization Review: Michaela Bhardwaj    Subjective:     Mr. Ellie Kraus is a 56 yo male with PMH of tobacco use admitted with left great toe infection. He was seen in the ED  s/p I and D and sent home on oral clindamycin to return with worsening complaint. Xray left foot with gas in great toe. Day 1 vancomycin and zosyn, BC pending. Vascular consulted and STEVEN ordered. Plans for possible left great toe amputation due to gangrene.  has left foot pain and darkening of great toe, no dyspnea or cough, no anorexia or BM change         Objective:   Patient Vitals for the past 24 hrs:   Temp Pulse Resp BP SpO2   17 1131 97.6 °F (36.4 °C) (!) 52 18 (!) 149/93 -   17 0734 98.3 °F (36.8 °C) 64 18 (!) 123/97 97 %   17 0709 98.3 °F (36.8 °C) (!) 59 - 142/88 99 %   17 0516 98.3 °F (36.8 °C) 60 19 125/86 94 %   17 0115 98.1 °F (36.7 °C) 61 20 (!) 147/93 99 %   17 1917 98.2 °F (36.8 °C) 63 22 (!) 146/102 100 %   17 1838 - 61 - 132/89 100 %   17 1819 - 60 - 124/87 99 %   17 - - - - 95 %   17 1759 - (!) 58 - (!) 137/92 99 %   17 1739 - 62 - (!) 134/91 99 %   17 1719 - (!) 56 - (!) 140/98 99 %   17 1658 - (!) 55 - (!) 159/105 99 %     Oxygen Therapy  O2 Sat (%): 97 % (17 0734)  Pulse via Oximetry: 61 beats per minute (17 1838)  O2 Device: Room air (17)    Intake/Output Summary (Last 24 hours) at 17 1649  Last data filed at 17 1134   Gross per 24 hour   Intake             1150 ml   Output              350 ml   Net              800 ml         General:    Well nourished. Alert. CV:   RRR. No murmur, rub, or gallop.  Nonpalpable left DP  Lungs:   Clear to auscultation bilaterally. No wheezing, rhonchi, or rales. Abdomen:   Soft, nontender, nondistended. Extremities: Left great toe with gangrene   Skin:     No rashes or jaundice. Data Review:  I have reviewed all labs, meds, telemetry events, and studies from the last 24 hours. No results found for this or any previous visit (from the past 24 hour(s)).      All Micro Results     Procedure Component Value Units Date/Time    CULTURE, BLOOD [[id number]] Collected:  09/18/17 1510    Order Status:  Completed Specimen:  Blood from Blood Updated:  09/19/17 0638     Special Requests: --        LEFT  Antecubital       Culture result: NO GROWTH AFTER 14 HOURS       CULTURE, BLOOD [[id number]] Collected:  09/18/17 1518    Order Status:  Completed Specimen:  Blood from Blood Updated:  09/19/17 9381     Special Requests: --        RIGHT  FOREARM       Culture result: NO GROWTH AFTER 14 HOURS             Current Meds:  Current Facility-Administered Medications   Medication Dose Route Frequency    [START ON 9/20/2017] VANCOMYCIN Trough Reminder   Other ONCE    sodium chloride (NS) flush 5-10 mL  5-10 mL IntraVENous Q8H    sodium chloride (NS) flush 5-10 mL  5-10 mL IntraVENous PRN    tuberculin injection 5 Units  5 Units IntraDERMal ONCE    acetaminophen (TYLENOL) tablet 650 mg  650 mg Oral Q4H PRN    HYDROcodone-acetaminophen (NORCO) 7.5-325 mg per tablet 1 Tab  1 Tab Oral Q4H PRN    HYDROmorphone (PF) (DILAUDID) injection 1 mg  1 mg IntraVENous Q4H PRN    naloxone (NARCAN) injection 0.4 mg  0.4 mg IntraVENous PRN    diphenhydrAMINE (BENADRYL) injection 12.5 mg  12.5 mg IntraVENous Q4H PRN    ondansetron (ZOFRAN) injection 4 mg  4 mg IntraVENous Q4H PRN    bisacodyl (DULCOLAX) tablet 5 mg  5 mg Oral DAILY PRN    nicotine (NICODERM CQ) 7 mg/24 hr patch 1 Patch  1 Patch TransDERmal Q24H    vancomycin (VANCOCIN) 1250 mg in  ml infusion  1,250 mg IntraVENous Q12H    enoxaparin (LOVENOX) injection 40 mg  40 mg SubCUTAneous Q24H    piperacillin-tazobactam (ZOSYN) 3.375 g in 0.9% sodium chloride (MBP/ADV) 100 mL  3.375 g IntraVENous Q8H       Other Studies (last 24 hours):  Duplex Low Ext Arteries With Janine    Result Date: 9/19/2017  Lower extremity arterial duplex, 9/19/2017. INDICATION: Left great toe gangrene. COMPARISON: None. FINDINGS: The resting ankle-brachial index on the right is 1.15 and on the left 0.93. The digital pressure ratio on the right is 0.69 and on the left it is 0. This is at the great toe. Digital waveforms show relatively normal waveform on the right and a very flattened waveform on the left great toe. The duplex of the right lower extremity shows triphasic waveform at the common femoral artery. The profunda femoris artery is patent. SFA appears to be within normal limits. The peroneal artery and PTA are patent. The FLAKO is somewhat diminished flow. The left common femoral artery is patent the profunda femoris artery is also patent. The SFA has biphasic waveform flow. Popliteal artery the peroneal artery and PTA and FLAKO are patent. However there is more spectral broadening of the vessels below the knee. IMPRESSION: 1. Right lower extremity: Diminished flow in the distal right anterior tibial artery. No other significant stenoses noted. 2. 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 More monophasic waveforms and popliteal artery distally suggest some diffuse but not flow limiting disease.       Assessment and Plan:     Hospital Problems as of 9/19/2017  Never Reviewed          Codes Class Noted - Resolved POA    Infection of toe ICD-10-CM: L08.9  ICD-9-CM: 686.9  9/18/2017 - Present Unknown              PLAN:    · Appreciate vascular input, possible left great toe amputation   · Continue present antibiotics and followup micro   · Needs tobacco use cessation, has nicotine patch   · Check hepatitis panel and HIV with elevated protein     DC planning/Dispo:    DVT ppx: lovenox    Signed:  Vero Lau MD

## 2017-09-19 NOTE — CONSULTS
[facility]   [address] [place] [address] , [address], [address] [facility] [address]  Phone: [phone]  Fax: [phone]    Date of visit: 9/19/2017    Referring physician: No referring provider defined for this encounter. Reason for referral: Left first toe gangrene    Beth Almaguer is a 55 y.o. male sent in consultation for   Chief Complaint   Patient presents with    Toe Pain       HPI:     52yo M with PMH of cigarette smoking and HTN presented to the ER with worsening pain and swelling of Left great toe. Pt was seen in ER last week for an abscess which was I&D in ER and he was discharged on Clindamycin. Pt states he has been taking meds but symptoms didn't improve so he came to ER. Reports this first started as a blister on his toe one month ago. Denies history of DM and doesn't report claudication either but states his foot becomes numb when he stands up. X Ray in ER showed gas formation in Left great Toe. He was startred on Vanc/Zosyn, hospitalist asked to admit. Today he states he feels pretty well. He denies fever or chills. His left first toe remains quite painful. There is an open portion at the base of the left first toe with surrounding necrotic tissue. It is malodorous. He has history of cigarette smoking although denies diabetes. I am not sure if he is ever been evaluated for diabetes. ROS:    Constitutional: Negative for fever and chills. HENT: Negative for congestion and sore throat. Skin: Negative for rash and itching. Eyes: Negative for blurred vision and double vision. Respiratory: Negative for cough and shortness of breath. Cardiovascular: Negative for chest pain, palpitations, claudication and leg swelling. Gastrointestinal: Negative for nausea, vomiting and abdominal pain. Genitourinary: Negative for dysuria, hematuria and flank pain. Musculoskeletal: Negative for joint pain and falls.   Neurological: Negative for dizziness, sensory change, focal weakness, loss of consciousness and headaches. Medical history:   Past Medical History:   Diagnosis Date    Hypertension        Surgical history: History reviewed. No pertinent surgical history. Allergies: No Known Allergies    Current medications:   Current Facility-Administered Medications   Medication Dose Route Frequency    [START ON 9/20/2017] VANCOMYCIN Trough Reminder   Other ONCE    sodium chloride (NS) flush 5-10 mL  5-10 mL IntraVENous Q8H    sodium chloride (NS) flush 5-10 mL  5-10 mL IntraVENous PRN    tuberculin injection 5 Units  5 Units IntraDERMal ONCE    acetaminophen (TYLENOL) tablet 650 mg  650 mg Oral Q4H PRN    HYDROcodone-acetaminophen (NORCO) 7.5-325 mg per tablet 1 Tab  1 Tab Oral Q4H PRN    HYDROmorphone (PF) (DILAUDID) injection 1 mg  1 mg IntraVENous Q4H PRN    naloxone (NARCAN) injection 0.4 mg  0.4 mg IntraVENous PRN    diphenhydrAMINE (BENADRYL) injection 12.5 mg  12.5 mg IntraVENous Q4H PRN    ondansetron (ZOFRAN) injection 4 mg  4 mg IntraVENous Q4H PRN    bisacodyl (DULCOLAX) tablet 5 mg  5 mg Oral DAILY PRN    nicotine (NICODERM CQ) 7 mg/24 hr patch 1 Patch  1 Patch TransDERmal Q24H    vancomycin (VANCOCIN) 1250 mg in  ml infusion  1,250 mg IntraVENous Q12H    enoxaparin (LOVENOX) injection 40 mg  40 mg SubCUTAneous Q24H    piperacillin-tazobactam (ZOSYN) 3.375 g in 0.9% sodium chloride (MBP/ADV) 100 mL  3.375 g IntraVENous Q8H       Social history:   History   Smoking Status    Current Every Day Smoker   Smokeless Tobacco    Never Used                               History   Alcohol Use    Yes       Imaging:    XR GREAT TOE LT MIN 2 V    9/18/2017 3:32 PM      CLINICAL INFORMATION:  Left great toe infection with possible tissue necrosis. Tenderness.      Comparison: 9/9/2017.     Findings: 3 views left toes. There are multiple small soft tissue gas bubbles in  the great toe at the level of the distal phalanx. No retained radiopaque foreign  body.  No acute fracture. No overt erosive nor osseous destructive process.     IMPRESSION  IMPRESSION:     1. Numerous small soft tissue gas bubbles are present at the level of the distal  phalanx. Vitals:   Vitals:    09/19/17 0115 09/19/17 0516 09/19/17 0709 09/19/17 0734   BP: (!) 147/93 125/86 142/88 (!) 123/97   BP 1 Location:       BP Patient Position:       Pulse: 61 60 (!) 59 64   Resp: 20 19 18   Temp: 98.1 °F (36.7 °C) 98.3 °F (36.8 °C) 98.3 °F (36.8 °C) 98.3 °F (36.8 °C)   SpO2: 99% 94% 99% 97%   Weight:       Height:           Physical exam:  Visit Vitals    BP (!) 123/97    Pulse 64    Temp 98.3 °F (36.8 °C)    Resp 18    Ht 6' 1\" (1.854 m)    Wt 178 lb (80.7 kg)    SpO2 97%    BMI 23.48 kg/m2     General appearance: alert, cooperative, no distress, appears stated age  Lungs: clear to auscultation bilaterally  Heart: regular rate and rhythm, S1, S2 normal, no murmur, click, rub or gallop  Abdomen: soft, non-tender. Bowel sounds normal. No masses,  no organomegaly  Extremities: extremities normal, atraumatic, no cyanosis or edema  Skin: Skin color, texture, turgor normal. No rashes or lesions. Left first toe is necrotic down to the proximal phalanx. There is surrounding areas of discoloration at the base of the first toe as well. It is malodorous. No purulent drainage  Neurologic: Grossly normal  Vascular Exam:    Right:    LEFT:                      Radial: 2+    Radial: 2+         Brachial: 2+   Brachial: 2+          Femoral: 2+   Femoral: 2+         Popliteal: 2+   Popliteal: 2+         DP:NP    DP: NP         PT: 2+    PT: 2+         Carotid Bruit: No   Carotid Bruit: No    Impression:     ICD-10-CM ICD-9-CM    1. Cellulitis of toe of left foot L03.032 681.10        Plan:    Left first toe gangrene with underlying soft tissue infection. Given the findings of the x-ray I think that he needs to have an open left first toe amputation.   Hopefully we can decontaminate the wound and closed him secondarily. I discussed this with the patient today. He has palpable posterior tibial pulses however with his history of tobacco abuse we will get some baseline noninvasive studies today. He is on IV antibiotics currently. Approximately 45 minutes  was spent in direct patient contact during this encounter including 50% of which was spend in patient education, counseling, review of medical history and imaging, and medical decision making. Tony Lyon MD    Elements of this note have been dictated using speech recognition software. As a result, errors of speech recognition may have occurred.

## 2017-09-19 NOTE — INTERDISCIPLINARY ROUNDS
Interdisciplinary team rounds were held 9/19/2017 with the following team members:Care Management, Nursing, Pharmacy, Physical Therapy and Physician. Plan of care discussed. See clinical pathway and/or care plan for interventions and desired outcomes.

## 2017-09-20 ENCOUNTER — ANESTHESIA (OUTPATIENT)
Dept: SURGERY | Age: 57
DRG: 580 | End: 2017-09-20
Payer: MEDICARE

## 2017-09-20 LAB
CREAT SERPL-MCNC: 0.88 MG/DL (ref 0.8–1.5)
VANCOMYCIN TROUGH SERPL-MCNC: 11.9 UG/ML (ref 5–20)

## 2017-09-20 PROCEDURE — 74011000250 HC RX REV CODE- 250

## 2017-09-20 PROCEDURE — 74011000258 HC RX REV CODE- 258: Performed by: INTERNAL MEDICINE

## 2017-09-20 PROCEDURE — 82565 ASSAY OF CREATININE: CPT | Performed by: SURGERY

## 2017-09-20 PROCEDURE — 74011250637 HC RX REV CODE- 250/637: Performed by: ANESTHESIOLOGY

## 2017-09-20 PROCEDURE — 77030018836 HC SOL IRR NACL ICUM -A: Performed by: SURGERY

## 2017-09-20 PROCEDURE — 77030011640 HC PAD GRND REM COVD -A: Performed by: SURGERY

## 2017-09-20 PROCEDURE — 74011250637 HC RX REV CODE- 250/637: Performed by: INTERNAL MEDICINE

## 2017-09-20 PROCEDURE — 74011250636 HC RX REV CODE- 250/636: Performed by: INTERNAL MEDICINE

## 2017-09-20 PROCEDURE — 74011250636 HC RX REV CODE- 250/636: Performed by: ANESTHESIOLOGY

## 2017-09-20 PROCEDURE — 36415 COLL VENOUS BLD VENIPUNCTURE: CPT | Performed by: SURGERY

## 2017-09-20 PROCEDURE — 74011000250 HC RX REV CODE- 250: Performed by: SURGERY

## 2017-09-20 PROCEDURE — 77030003602 HC NDL NRV BLK BBMI -B: Performed by: ANESTHESIOLOGY

## 2017-09-20 PROCEDURE — 80202 ASSAY OF VANCOMYCIN: CPT | Performed by: SURGERY

## 2017-09-20 PROCEDURE — 77030020782 HC GWN BAIR PAWS FLX 3M -B: Performed by: REGISTERED NURSE

## 2017-09-20 PROCEDURE — 74011250636 HC RX REV CODE- 250/636

## 2017-09-20 PROCEDURE — 74011000258 HC RX REV CODE- 258

## 2017-09-20 PROCEDURE — 65270000029 HC RM PRIVATE

## 2017-09-20 PROCEDURE — 88305 TISSUE EXAM BY PATHOLOGIST: CPT | Performed by: SURGERY

## 2017-09-20 PROCEDURE — 76010000160 HC OR TIME 0.5 TO 1 HR INTENSV-TIER 1: Performed by: SURGERY

## 2017-09-20 PROCEDURE — 74011250636 HC RX REV CODE- 250/636: Performed by: EMERGENCY MEDICINE

## 2017-09-20 PROCEDURE — 76060000032 HC ANESTHESIA 0.5 TO 1 HR: Performed by: SURGERY

## 2017-09-20 PROCEDURE — 76210000006 HC OR PH I REC 0.5 TO 1 HR: Performed by: SURGERY

## 2017-09-20 RX ORDER — ALBUTEROL SULFATE 0.83 MG/ML
2.5 SOLUTION RESPIRATORY (INHALATION) AS NEEDED
Status: DISCONTINUED | OUTPATIENT
Start: 2017-09-20 | End: 2017-09-20 | Stop reason: HOSPADM

## 2017-09-20 RX ORDER — NICOTINE 7MG/24HR
1 PATCH, TRANSDERMAL 24 HOURS TRANSDERMAL DAILY
Status: DISCONTINUED | OUTPATIENT
Start: 2017-09-20 | End: 2017-09-23 | Stop reason: HOSPADM

## 2017-09-20 RX ORDER — ACETAMINOPHEN 500 MG
1000 TABLET ORAL ONCE
Status: COMPLETED | OUTPATIENT
Start: 2017-09-20 | End: 2017-09-20

## 2017-09-20 RX ORDER — PROPOFOL 10 MG/ML
INJECTION, EMULSION INTRAVENOUS
Status: DISCONTINUED | OUTPATIENT
Start: 2017-09-20 | End: 2017-09-20 | Stop reason: HOSPADM

## 2017-09-20 RX ORDER — LIDOCAINE HYDROCHLORIDE 20 MG/ML
INJECTION, SOLUTION EPIDURAL; INFILTRATION; INTRACAUDAL; PERINEURAL AS NEEDED
Status: DISCONTINUED | OUTPATIENT
Start: 2017-09-20 | End: 2017-09-20 | Stop reason: HOSPADM

## 2017-09-20 RX ORDER — MIDAZOLAM HYDROCHLORIDE 1 MG/ML
2 INJECTION, SOLUTION INTRAMUSCULAR; INTRAVENOUS
Status: COMPLETED | OUTPATIENT
Start: 2017-09-20 | End: 2017-09-22

## 2017-09-20 RX ORDER — NICOTINE 7MG/24HR
1 PATCH, TRANSDERMAL 24 HOURS TRANSDERMAL DAILY
Status: DISCONTINUED | OUTPATIENT
Start: 2017-09-21 | End: 2017-09-20

## 2017-09-20 RX ORDER — SODIUM CHLORIDE 9 MG/ML
INJECTION, SOLUTION INTRAVENOUS
Status: DISCONTINUED | OUTPATIENT
Start: 2017-09-20 | End: 2017-09-20 | Stop reason: HOSPADM

## 2017-09-20 RX ORDER — IBUPROFEN 200 MG
1 TABLET ORAL DAILY
Status: DISCONTINUED | OUTPATIENT
Start: 2017-09-21 | End: 2017-09-20

## 2017-09-20 RX ORDER — ONDANSETRON 2 MG/ML
4 INJECTION INTRAMUSCULAR; INTRAVENOUS
Status: DISCONTINUED | OUTPATIENT
Start: 2017-09-20 | End: 2017-09-20 | Stop reason: HOSPADM

## 2017-09-20 RX ORDER — PROPOFOL 10 MG/ML
INJECTION, EMULSION INTRAVENOUS AS NEEDED
Status: DISCONTINUED | OUTPATIENT
Start: 2017-09-20 | End: 2017-09-20 | Stop reason: HOSPADM

## 2017-09-20 RX ORDER — SODIUM CHLORIDE 0.9 % (FLUSH) 0.9 %
5-10 SYRINGE (ML) INJECTION AS NEEDED
Status: DISCONTINUED | OUTPATIENT
Start: 2017-09-20 | End: 2017-09-23 | Stop reason: SDUPTHER

## 2017-09-20 RX ORDER — SODIUM CHLORIDE 0.9 % (FLUSH) 0.9 %
5-10 SYRINGE (ML) INJECTION EVERY 8 HOURS
Status: DISCONTINUED | OUTPATIENT
Start: 2017-09-20 | End: 2017-09-23 | Stop reason: SDUPTHER

## 2017-09-20 RX ORDER — SODIUM CHLORIDE 0.9 % (FLUSH) 0.9 %
5-10 SYRINGE (ML) INJECTION AS NEEDED
Status: DISCONTINUED | OUTPATIENT
Start: 2017-09-20 | End: 2017-09-20 | Stop reason: HOSPADM

## 2017-09-20 RX ORDER — SODIUM CHLORIDE, SODIUM LACTATE, POTASSIUM CHLORIDE, CALCIUM CHLORIDE 600; 310; 30; 20 MG/100ML; MG/100ML; MG/100ML; MG/100ML
1000 INJECTION, SOLUTION INTRAVENOUS CONTINUOUS
Status: DISPENSED | OUTPATIENT
Start: 2017-09-20 | End: 2017-09-21

## 2017-09-20 RX ORDER — BACITRACIN 50000 [IU]/1
INJECTION, POWDER, FOR SOLUTION INTRAMUSCULAR AS NEEDED
Status: DISCONTINUED | OUTPATIENT
Start: 2017-09-20 | End: 2017-09-20 | Stop reason: HOSPADM

## 2017-09-20 RX ORDER — HYDROMORPHONE HYDROCHLORIDE 2 MG/ML
0.5 INJECTION, SOLUTION INTRAMUSCULAR; INTRAVENOUS; SUBCUTANEOUS
Status: DISCONTINUED | OUTPATIENT
Start: 2017-09-20 | End: 2017-09-20 | Stop reason: HOSPADM

## 2017-09-20 RX ORDER — LIDOCAINE HYDROCHLORIDE 10 MG/ML
0.1 INJECTION INFILTRATION; PERINEURAL AS NEEDED
Status: DISCONTINUED | OUTPATIENT
Start: 2017-09-20 | End: 2017-09-23 | Stop reason: HOSPADM

## 2017-09-20 RX ADMIN — VANCOMYCIN HYDROCHLORIDE 1250 MG: 10 INJECTION, POWDER, LYOPHILIZED, FOR SOLUTION INTRAVENOUS at 17:42

## 2017-09-20 RX ADMIN — HYDROMORPHONE HYDROCHLORIDE 1 MG: 1 INJECTION, SOLUTION INTRAMUSCULAR; INTRAVENOUS; SUBCUTANEOUS at 11:07

## 2017-09-20 RX ADMIN — SODIUM CHLORIDE, SODIUM LACTATE, POTASSIUM CHLORIDE, AND CALCIUM CHLORIDE: 600; 310; 30; 20 INJECTION, SOLUTION INTRAVENOUS at 07:08

## 2017-09-20 RX ADMIN — PIPERACILLIN SODIUM,TAZOBACTAM SODIUM 3.38 G: 3; .375 INJECTION, POWDER, FOR SOLUTION INTRAVENOUS at 16:09

## 2017-09-20 RX ADMIN — PROPOFOL 100 MG: 10 INJECTION, EMULSION INTRAVENOUS at 07:12

## 2017-09-20 RX ADMIN — PIPERACILLIN SODIUM,TAZOBACTAM SODIUM 3.38 G: 3; .375 INJECTION, POWDER, FOR SOLUTION INTRAVENOUS at 07:08

## 2017-09-20 RX ADMIN — PROPOFOL 140 MCG/KG/MIN: 10 INJECTION, EMULSION INTRAVENOUS at 07:12

## 2017-09-20 RX ADMIN — HYDROMORPHONE HYDROCHLORIDE 1 MG: 1 INJECTION, SOLUTION INTRAMUSCULAR; INTRAVENOUS; SUBCUTANEOUS at 20:38

## 2017-09-20 RX ADMIN — Medication 10 ML: at 05:00

## 2017-09-20 RX ADMIN — HYDROCODONE BITARTRATE AND ACETAMINOPHEN 1 TABLET: 7.5; 325 TABLET ORAL at 23:06

## 2017-09-20 RX ADMIN — SODIUM CHLORIDE, SODIUM LACTATE, POTASSIUM CHLORIDE, AND CALCIUM CHLORIDE 1000 ML: 600; 310; 30; 20 INJECTION, SOLUTION INTRAVENOUS at 06:39

## 2017-09-20 RX ADMIN — Medication 10 ML: at 23:14

## 2017-09-20 RX ADMIN — VANCOMYCIN HYDROCHLORIDE 1250 MG: 10 INJECTION, POWDER, LYOPHILIZED, FOR SOLUTION INTRAVENOUS at 03:40

## 2017-09-20 RX ADMIN — HYDROMORPHONE HYDROCHLORIDE 1 MG: 1 INJECTION, SOLUTION INTRAMUSCULAR; INTRAVENOUS; SUBCUTANEOUS at 16:12

## 2017-09-20 RX ADMIN — ACETAMINOPHEN 1000 MG: 500 TABLET, FILM COATED ORAL at 05:20

## 2017-09-20 RX ADMIN — SODIUM CHLORIDE: 9 INJECTION, SOLUTION INTRAVENOUS at 07:08

## 2017-09-20 RX ADMIN — LIDOCAINE HYDROCHLORIDE 60 MG: 20 INJECTION, SOLUTION EPIDURAL; INFILTRATION; INTRACAUDAL; PERINEURAL at 07:12

## 2017-09-20 RX ADMIN — Medication 10 ML: at 14:24

## 2017-09-20 RX ADMIN — Medication 10 ML: at 22:00

## 2017-09-20 RX ADMIN — Medication 10 ML: at 14:00

## 2017-09-20 RX ADMIN — PIPERACILLIN SODIUM,TAZOBACTAM SODIUM 3.38 G: 3; .375 INJECTION, POWDER, FOR SOLUTION INTRAVENOUS at 23:07

## 2017-09-20 NOTE — PERIOP NOTES
TRANSFER - OUT REPORT:    Verbal report given to Lacey Leyden RN(name) on Chai Radha  being transferred to 604(unit) for routine post - op       Report consisted of patients Situation, Background, Assessment and   Recommendations(SBAR). Information from the following report(s) Kardex, OR Summary and Intake/Output was reviewed with the receiving nurse. Lines:   Peripheral IV 09/18/17 Left Antecubital (Active)   Site Assessment Clean, dry, & intact 9/20/2017 12:39 AM   Phlebitis Assessment 0 9/20/2017 12:39 AM   Infiltration Assessment 0 9/20/2017 12:39 AM   Dressing Status Clean, dry, & intact 9/20/2017 12:39 AM   Dressing Type Tape;Transparent 9/20/2017 12:39 AM   Hub Color/Line Status Infusing 9/20/2017 12:39 AM   Alcohol Cap Used No 9/20/2017 12:39 AM       Peripheral IV 09/20/17 Left Arm (Active)   Site Assessment Clean, dry, & intact 9/20/2017  7:48 AM   Phlebitis Assessment 0 9/20/2017  7:48 AM   Infiltration Assessment 0 9/20/2017  7:48 AM   Dressing Status Clean, dry, & intact 9/20/2017  7:48 AM   Dressing Type Tape;Transparent 9/20/2017  7:48 AM   Hub Color/Line Status Green; Infusing 9/20/2017  7:48 AM       Peripheral IV 09/20/17 Right Hand (Active)   Site Assessment Clean, dry, & intact 9/20/2017  7:48 AM   Phlebitis Assessment 0 9/20/2017  7:48 AM   Infiltration Assessment 0 9/20/2017  7:48 AM   Dressing Status Clean, dry, & intact 9/20/2017  7:48 AM   Dressing Type Tape;Transparent 9/20/2017  7:48 AM   Hub Color/Line Status Green 9/20/2017  7:48 AM        Opportunity for questions and clarification was provided.

## 2017-09-20 NOTE — PROGRESS NOTES
Hourly rounds completed throughout the night. Pt. slept during the night. No signs of distress during the night.

## 2017-09-20 NOTE — ANESTHESIA PROCEDURE NOTES
Peripheral Block    Start time: 9/20/2017 6:48 AM  End time: 9/20/2017 6:52 AM  Performed by: Roxine Sacks  Authorized by: Roxine Sacks       Pre-procedure:    Indications: at surgeon's request and post-op pain management    Preanesthetic Checklist: patient identified, risks and benefits discussed, site marked, timeout performed, anesthesia consent given and patient being monitored    Timeout Time: 06:48          Block Type:   Block Type:  Popliteal  Laterality:  Left  Monitoring:  Continuous pulse ox, frequent vital sign checks, heart rate, oxygen and responsive to questions  Injection Technique:  Single shot  Procedures: ultrasound guided    Patient Position: supine  Prep: chlorhexidine    Location:  Lower thigh  Needle Type:  Stimuplex  Needle Gauge:  20 G  Needle Localization:  Ultrasound guidance  Medication Injected:  0.5%  ropivacaine  Volume (mL):  20    Assessment:  Number of attempts:  1  Injection Assessment:  Incremental injection every 5 mL, local visualized surrounding nerve on ultrasound, negative aspiration for blood, no intravascular symptoms, no paresthesia and ultrasound image on chart  Patient tolerance:  Patient tolerated the procedure well with no immediate complications

## 2017-09-20 NOTE — PROGRESS NOTES
Pharmacokinetic Consult to Pharmacist    Hallie Oliva is a 55 y.o. male being treated for SSTI with vancomycin and zosyn. Height: 6' 1\" (185.4 cm)  Weight: 80.7 kg (178 lb)  Lab Results   Component Value Date/Time    BUN 12 09/18/2017 03:10 PM    Creatinine 0.88 09/20/2017 04:08 PM    WBC 8.8 09/18/2017 03:10 PM    Procalcitonin 0.1 09/18/2017 03:10 PM    Lactic Acid (POC) 1.1 09/18/2017 03:28 PM      Estimated Creatinine Clearance: 107.2 mL/min (based on Cr of 0.88). Lab Results   Component Value Date/Time    Vancomycin,trough 11.9 09/20/2017 04:08 PM       Day 3 of vancomycin. Goal trough is 12-18. Trough = 11.9, no changes continue 1.25g q12h. Will continue to follow patient. Thank you,  Adriana Gong, Pharm. D.   Clinical Pharmacist  [phone]

## 2017-09-20 NOTE — INTERDISCIPLINARY ROUNDS
Interdisciplinary team rounds were held 9/20/2017 with the following team members:Care Management, Nursing, Pharmacy, Physical Therapy and Physician. Plan of care discussed. See clinical pathway and/or care plan for interventions and desired outcomes.

## 2017-09-20 NOTE — PERIOP NOTES
TRANSFER - IN REPORT:    Verbal report received from Sunni Score on Alexandra Clear  being received from 604(unit) for routine progression of care      Report consisted of patients Situation, Background, Assessment and   Recommendations(SBAR). Information from the following report(s) SBAR was reviewed with the receiving nurse. Opportunity for questions and clarification was provided. Assessment completed upon patients arrival to unit and care assumed.

## 2017-09-20 NOTE — PROGRESS NOTES
Hospitalist Progress Note     Admit Date:  2017  1:09 PM   Name:  Keerthi De La Fuente   Age:  55 y.o.  :  1960   MRN:  [id number]   PCP:  None  Treatment Team: Attending Provider: Yogi Cervantes MD; Consulting Provider: Christopher Beebe MD; Utilization Review: Valentín Patel    Subjective:         Mr. Perla Vera is a 56 yo male with PMH of tobacco use admitted with left great toe infection/gangrene. He was seen in the ED  s/p I and D and sent home on oral clindamycin to return with worsening complaint. Xray left foot with gas in great toe. Day 2 vancomycin and zosyn, BC NGTD. Vascular consulted and STEVEN ordered with minimal disease. He had 17 left great toe amputation due to gangrene.       pain improved, waiting on lunch postop        Objective:     Patient Vitals for the past 24 hrs:   Temp Pulse Resp BP SpO2   17 1020 97.5 °F (36.4 °C) (!) 56 16 122/82 96 %   17 0812 97.7 °F (36.5 °C) (!) 51 16 128/87 99 %   17 0807 - (!) 49 16 (!) 153/96 99 %   17 0802 - (!) 52 16 (!) 136/92 100 %   17 0757 - 64 16 (!) 131/99 99 %   17 0752 - (!) 52 16 (!) 121/95 99 %   17 0748 97.4 °F (36.3 °C) (!) 57 16 (!) 119/92 100 %   17 0747 - (!) 59 - (!) 119/92 97 %   17 0700 - (!) 56 20 (!) 161/110 98 %   17 0655 - (!) 57 20 (!) 153/113 99 %   17 0653 - (!) 59 20 (!) 171/113 99 %   17 0648 - (!) 57 16 (!) 173/110 100 %   17 0545 97.9 °F (36.6 °C) 60 20 (!) 134/98 99 %   17 0529 97.7 °F (36.5 °C) 63 18 120/83 99 %   17 2349 98.2 °F (36.8 °C) 62 18 119/78 96 %   17 1944 97.7 °F (36.5 °C) 61 18 134/86 99 %     Oxygen Therapy  O2 Sat (%): 96 % (17 1020)  Pulse via Oximetry: 51 beats per minute (17 0812)  O2 Device: Room air (17 08)  O2 Flow Rate (L/min): 3 l/min (17 0748)    Intake/Output Summary (Last 24 hours) at 17 1342  Last data filed at 17 0732   Gross per 24 hour   Intake              640 ml Output                3 ml   Net              637 ml         General:    Well nourished. Alert. CV:   RRR. No murmur, rub, or gallop. Lungs:   Clear to auscultation bilaterally. No wheezing, rhonchi, or rales. Abdomen:   Soft, nontender, nondistended. Skin:     No rashes or jaundice. Data Review:  I have reviewed all labs, meds, telemetry events, and studies from the last 24 hours.     Recent Results (from the past 24 hour(s))   HIV-1,2 P24 AG/AB SCREEN    Collection Time: 09/19/17  5:53 PM   Result Value Ref Range    p24 Antigen NONREACTIVE NR      HIV-1,2 Ab NONREACTIVE NR          All Micro Results     Procedure Component Value Units Date/Time    CULTURE, BLOOD [[id number]] Collected:  09/18/17 1510    Order Status:  Completed Specimen:  Blood from Blood Updated:  09/20/17 0644     Special Requests: --        LEFT  Antecubital       Culture result: NO GROWTH 2 DAYS       CULTURE, BLOOD [[id number]] Collected:  09/18/17 1518    Order Status:  Completed Specimen:  Blood from Blood Updated:  09/20/17 0644     Special Requests: --        RIGHT  FOREARM       Culture result: NO GROWTH 2 DAYS             Current Meds:  Current Facility-Administered Medications   Medication Dose Route Frequency    lidocaine (XYLOCAINE) 10 mg/mL (1 %) injection 0.1 mL  0.1 mL SubCUTAneous PRN    lactated Ringers infusion 1,000 mL  1,000 mL IntraVENous CONTINUOUS    sodium chloride (NS) flush 5-10 mL  5-10 mL IntraVENous Q8H    sodium chloride (NS) flush 5-10 mL  5-10 mL IntraVENous PRN    midazolam (VERSED) injection 2 mg  2 mg IntraVENous ONCE PRN    Vancomycin Trough Reminder   Other ONCE    [START ON 9/21/2017] nicotine (NICODERM CQ) 7 mg/24 hr patch 1 Patch  1 Patch TransDERmal DAILY    enalaprilat (VASOTEC) injection 0.625 mg  0.625 mg IntraVENous Q6H PRN    sodium chloride (NS) flush 5-10 mL  5-10 mL IntraVENous Q8H    sodium chloride (NS) flush 5-10 mL  5-10 mL IntraVENous PRN    acetaminophen (TYLENOL) tablet 650 mg  650 mg Oral Q4H PRN    HYDROcodone-acetaminophen (NORCO) 7.5-325 mg per tablet 1 Tab  1 Tab Oral Q4H PRN    HYDROmorphone (PF) (DILAUDID) injection 1 mg  1 mg IntraVENous Q4H PRN    naloxone (NARCAN) injection 0.4 mg  0.4 mg IntraVENous PRN    diphenhydrAMINE (BENADRYL) injection 12.5 mg  12.5 mg IntraVENous Q4H PRN    ondansetron (ZOFRAN) injection 4 mg  4 mg IntraVENous Q4H PRN    bisacodyl (DULCOLAX) tablet 5 mg  5 mg Oral DAILY PRN    vancomycin (VANCOCIN) 1250 mg in  ml infusion  1,250 mg IntraVENous Q12H    enoxaparin (LOVENOX) injection 40 mg  40 mg SubCUTAneous Q24H    piperacillin-tazobactam (ZOSYN) 3.375 g in 0.9% sodium chloride (MBP/ADV) 100 mL  3.375 g IntraVENous Q8H       Other Studies (last 24 hours):  No results found.     Assessment and Plan:     Hospital Problems as of 9/20/2017  Never Reviewed          Codes Class Noted - Resolved POA    Infection of toe ICD-10-CM: L08.9  ICD-9-CM: 686.9  9/18/2017 - Present Unknown              PLAN:    · Appreciate vascular  · Continue present antibiotics and followup micro   · Needs tobacco use cessation, has nicotine patch     DC planning/Dispo:  pending  DVT ppx:  lovenox    Signed:  Navid Cross MD

## 2017-09-20 NOTE — ANESTHESIA PREPROCEDURE EVALUATION
Anesthetic History               Review of Systems / Medical History  Patient summary reviewed and pertinent labs reviewed    Pulmonary          Smoker         Neuro/Psych   Within defined limits           Cardiovascular    Hypertension              Exercise tolerance: >4 METS     GI/Hepatic/Renal  Within defined limits              Endo/Other  Within defined limits           Other Findings            Physical Exam    Airway  Mallampati: I  TM Distance: 4 - 6 cm  Neck ROM: normal range of motion   Mouth opening: Normal     Cardiovascular    Rhythm: regular  Rate: normal         Dental    Dentition: Edentulous     Pulmonary  Breath sounds clear to auscultation               Abdominal         Other Findings            Anesthetic Plan    ASA: 2  Anesthesia type: total IV anesthesia      Post-op pain plan if not by surgeon: peripheral nerve block single    Induction: Intravenous  Anesthetic plan and risks discussed with: Patient      Is amenable to ankle block or other regional block. He is also ok with GA but tonight he thought block and sedation would be better. He had normal feeling in his foot/toe prior to the blister and infection.

## 2017-09-20 NOTE — ANESTHESIA POSTPROCEDURE EVALUATION
Post-Anesthesia Evaluation and Assessment    Patient: Tarsha Feliz MRN: [id number]  SSN: [id number]    YOB: 1960  Age: 55 y.o. Sex: male       Cardiovascular Function/Vital Signs  Visit Vitals    BP (!) 121/95    Pulse (!) 52    Temp 36.3 °C (97.4 °F)    Resp 16    Ht 6' 1\" (1.854 m)    Wt 80.7 kg (178 lb)    SpO2 99%    BMI 23.48 kg/m2       Patient is status post total IV anesthesia anesthesia for Procedure(s):  LEFT 1ST TOE AMPUTATION/ CHOICE ANES / ROOM 604. Nausea/Vomiting: None    Postoperative hydration reviewed and adequate. Pain:  Pain Scale 1: Numeric (0 - 10) (09/20/17 0545)  Pain Intensity 1: 10 (09/20/17 0545)   Managed    Neurological Status: At baseline    Mental Status and Level of Consciousness: Arousable    Pulmonary Status:   O2 Device: Nasal cannula (09/20/17 0748)   Adequate oxygenation and airway patent    Complications related to anesthesia: None    Post-anesthesia assessment completed.  No concerns    Signed By: Lisa Bernstein MD     September 20, 2017

## 2017-09-20 NOTE — PROGRESS NOTES
TRANSFER - OUT REPORT:    Verbal report given to Forest Gloria RN(name) on Ritchie Rank  being transferred to Pre-op(unit) for ordered procedure       Report consisted of patients Situation, Background, Assessment and   Recommendations(SBAR). Information from the following report(s) SBAR, Kardex, [facility] and Recent Results was reviewed with the receiving nurse. Lines:   Peripheral IV 09/18/17 Left Antecubital (Active)   Site Assessment Clean, dry, & intact 9/20/2017 12:39 AM   Phlebitis Assessment 0 9/20/2017 12:39 AM   Infiltration Assessment 0 9/20/2017 12:39 AM   Dressing Status Clean, dry, & intact 9/20/2017 12:39 AM   Dressing Type Tape;Transparent 9/20/2017 12:39 AM   Hub Color/Line Status Infusing 9/20/2017 12:39 AM   Alcohol Cap Used No 9/20/2017 12:39 AM        Opportunity for questions and clarification was provided.       Patient transported with:   Registered Nurse

## 2017-09-20 NOTE — OP NOTES
[address] [facility]   OPERATIVE REPORT       Name:  Mikhail Messer   MR#:  [id number]   :  1960   Account #:  [de-identified]   Date of Adm:  2017       DATE OF PROCEDURE: 2017    PREOPERATIVE DIAGNOSIS: Left first toe gangrene. POSTOPERATIVE DIAGNOSIS: Left first toe gangrene. PROCEDURE: Open left first toe amputation. SURGEON: Marcella Ayala MD    ANESTHESIA: Regional block plus MAC. ESTIMATED BLOOD LOSS: Less than 10 mL. DRAINS: None. SPECIMENS: None. COMPLICATIONS: None. DESCRIPTION OF PROCEDURE: The patient was brought to the   operating room and placed on the operating table in supine   position. Following adequate IV sedation and a regional block at   the level of the ankle, the left foot was draped and prepped in   sterile fashion. A circumferential incision was made at the base   of the left first toe, below the level of gangrene. The wound   was deepened to the level of the bone sharply. There was good   bleeding from the wound margins. The proximal phalanx was then   transected with a bone cutter. The distal necrotic toe was   removed from the operative field. Next, the wound was copiously   irrigated with antibiotic solution and packed with Betadine-  soaked gauze sponge followed by a sterile dry dressing. The   patient was then awakened from anesthesia and transferred to   recovery in stable condition. The patient tolerated the   procedure well. No complications. All needle and sponge counts   were correct.         MD Magui Lucas / Bernard Saeed   D:  2017   11:21   T:  2017   11:30   Job #:  [id number]

## 2017-09-20 NOTE — BRIEF OP NOTE
BRIEF OPERATIVE NOTE    Date of Procedure: 9/20/2017   Preoperative Diagnosis: Gangrene ([facility] [address] [I96]  Postoperative Diagnosis: Gangrene ([facility] [address] Altamese Spittle    Procedure(s):  Open LEFT 1ST TOE AMPUTATION/ CHOICE ANES / ROOM 604  Surgeon(s) and Role:     * Karla Nath MD - Primary         Assistant Staff:       Surgical Staff:  Circ-1: Lorena Coyle RN  Scrub Tech-1: Carlene Hawkins  Scrub Tech-2: Loyde Holter  Event Time In   Incision Start 9336   Incision Close 0732     Anesthesia: Regional   Estimated Blood Loss: 10cc  Specimens:   ID Type Source Tests Collected by Time Destination   1 : left great toe Preservative Toe  Karla Nath MD 9/20/2017 4361 Pathology      Findings:    Complications: None  Implants: * No implants in log *

## 2017-09-20 NOTE — ANESTHESIA PROCEDURE NOTES
Peripheral Block    Start time: 9/20/2017 6:53 AM  End time: 9/20/2017 6:55 AM  Performed by: Hector Wray  Authorized by: Hector Wray       Pre-procedure: Indications: at surgeon's request and post-op pain management    Preanesthetic Checklist: patient identified, risks and benefits discussed, site marked, timeout performed, anesthesia consent given and patient being monitored    Timeout Time: 06:52          Block Type:   Block Type:   Adductor canal  Laterality:  Left  Monitoring:  Continuous pulse ox, frequent vital sign checks, heart rate, oxygen and responsive to questions  Injection Technique:  Single shot  Procedures: ultrasound guided    Patient Position: supine  Prep: chlorhexidine    Location:  Mid thigh  Needle Type:  Stimuplex  Needle Gauge:  20 G  Needle Localization:  Ultrasound guidance  Medication Injected:  0.5%  ropivacaine  Volume (mL):  10    Assessment:  Number of attempts:  1  Injection Assessment:  Incremental injection every 5 mL, local visualized surrounding nerve on ultrasound, negative aspiration for blood, no intravascular symptoms, no paresthesia and ultrasound image on chart  Patient tolerance:  Patient tolerated the procedure well with no immediate complications

## 2017-09-20 NOTE — PROGRESS NOTES
Hourly rounds done on patient throughout the day, all needs met. Patient complaining of pain rated 8/10 at 1107 and again at 1612 given Dilaudid 1mg IV. It was effective.

## 2017-09-21 ENCOUNTER — ANESTHESIA EVENT (OUTPATIENT)
Dept: SURGERY | Age: 57
DRG: 580 | End: 2017-09-21
Payer: MEDICARE

## 2017-09-21 LAB
ANION GAP SERPL CALC-SCNC: 4 MMOL/L (ref 7–16)
BASOPHILS # BLD: 0 K/UL (ref 0–0.2)
BASOPHILS NFR BLD: 0 % (ref 0–2)
BUN SERPL-MCNC: 6 MG/DL (ref 6–23)
CALCIUM SERPL-MCNC: 9.3 MG/DL (ref 8.3–10.4)
CHLORIDE SERPL-SCNC: 103 MMOL/L (ref 98–107)
CO2 SERPL-SCNC: 33 MMOL/L (ref 21–32)
CREAT SERPL-MCNC: 0.82 MG/DL (ref 0.8–1.5)
DIFFERENTIAL METHOD BLD: ABNORMAL
EOSINOPHIL # BLD: 0.1 K/UL (ref 0–0.8)
EOSINOPHIL NFR BLD: 2 % (ref 0.5–7.8)
ERYTHROCYTE [DISTWIDTH] IN BLOOD BY AUTOMATED COUNT: 13.6 % (ref 11.9–14.6)
GLUCOSE SERPL-MCNC: 83 MG/DL (ref 65–100)
HAV IGM SERPL QL IA: NEGATIVE
HBV CORE IGM SERPL QL IA: NEGATIVE
HBV SURFACE AG SERPL QL IA: NEGATIVE
HCT VFR BLD AUTO: 43.6 % (ref 41.1–50.3)
HCV AB S/CO SERPL IA: 0.1 S/CO RATIO (ref 0–0.9)
HGB BLD-MCNC: 14.8 G/DL (ref 13.6–17.2)
IMM GRANULOCYTES # BLD: 0 K/UL (ref 0–0.5)
IMM GRANULOCYTES NFR BLD: 0.2 % (ref 0–5)
LYMPHOCYTES # BLD: 1.1 K/UL (ref 0.5–4.6)
LYMPHOCYTES NFR BLD: 20 % (ref 13–44)
MCH RBC QN AUTO: 30.8 PG (ref 26.1–32.9)
MCHC RBC AUTO-ENTMCNC: 33.9 G/DL (ref 31.4–35)
MCV RBC AUTO: 90.6 FL (ref 79.6–97.8)
MONOCYTES # BLD: 0.6 K/UL (ref 0.1–1.3)
MONOCYTES NFR BLD: 11 % (ref 4–12)
NEUTS SEG # BLD: 3.6 K/UL (ref 1.7–8.2)
NEUTS SEG NFR BLD: 67 % (ref 43–78)
PLATELET # BLD AUTO: 327 K/UL (ref 150–450)
PMV BLD AUTO: 9.3 FL (ref 10.8–14.1)
POTASSIUM SERPL-SCNC: 4.1 MMOL/L (ref 3.5–5.1)
RBC # BLD AUTO: 4.81 M/UL (ref 4.23–5.67)
SODIUM SERPL-SCNC: 140 MMOL/L (ref 136–145)
WBC # BLD AUTO: 5.5 K/UL (ref 4.3–11.1)

## 2017-09-21 PROCEDURE — 65270000029 HC RM PRIVATE

## 2017-09-21 PROCEDURE — 74011250637 HC RX REV CODE- 250/637: Performed by: INTERNAL MEDICINE

## 2017-09-21 PROCEDURE — 74011250636 HC RX REV CODE- 250/636: Performed by: INTERNAL MEDICINE

## 2017-09-21 PROCEDURE — 74011250636 HC RX REV CODE- 250/636: Performed by: EMERGENCY MEDICINE

## 2017-09-21 PROCEDURE — 74011000258 HC RX REV CODE- 258: Performed by: INTERNAL MEDICINE

## 2017-09-21 PROCEDURE — 97530 THERAPEUTIC ACTIVITIES: CPT

## 2017-09-21 PROCEDURE — 36415 COLL VENOUS BLD VENIPUNCTURE: CPT | Performed by: INTERNAL MEDICINE

## 2017-09-21 PROCEDURE — 80048 BASIC METABOLIC PNL TOTAL CA: CPT | Performed by: INTERNAL MEDICINE

## 2017-09-21 PROCEDURE — 85025 COMPLETE CBC W/AUTO DIFF WBC: CPT | Performed by: INTERNAL MEDICINE

## 2017-09-21 PROCEDURE — 74011250637 HC RX REV CODE- 250/637: Performed by: PHYSICIAN ASSISTANT

## 2017-09-21 PROCEDURE — 97161 PT EVAL LOW COMPLEX 20 MIN: CPT

## 2017-09-21 RX ORDER — TRAMADOL HYDROCHLORIDE 50 MG/1
50 TABLET ORAL
Status: DISCONTINUED | OUTPATIENT
Start: 2017-09-21 | End: 2017-09-23

## 2017-09-21 RX ORDER — HYDROMORPHONE HYDROCHLORIDE 1 MG/ML
1 INJECTION, SOLUTION INTRAMUSCULAR; INTRAVENOUS; SUBCUTANEOUS
Status: DISCONTINUED | OUTPATIENT
Start: 2017-09-21 | End: 2017-09-23

## 2017-09-21 RX ADMIN — Medication 10 ML: at 06:00

## 2017-09-21 RX ADMIN — Medication 10 ML: at 21:40

## 2017-09-21 RX ADMIN — HYDROMORPHONE HYDROCHLORIDE 1 MG: 1 INJECTION, SOLUTION INTRAMUSCULAR; INTRAVENOUS; SUBCUTANEOUS at 23:07

## 2017-09-21 RX ADMIN — HYDROMORPHONE HYDROCHLORIDE 1 MG: 1 INJECTION, SOLUTION INTRAMUSCULAR; INTRAVENOUS; SUBCUTANEOUS at 00:46

## 2017-09-21 RX ADMIN — TRAMADOL HYDROCHLORIDE 50 MG: 50 TABLET, FILM COATED ORAL at 11:41

## 2017-09-21 RX ADMIN — HYDROCODONE BITARTRATE AND ACETAMINOPHEN 1 TABLET: 7.5; 325 TABLET ORAL at 11:41

## 2017-09-21 RX ADMIN — HYDROCODONE BITARTRATE AND ACETAMINOPHEN 1 TABLET: 7.5; 325 TABLET ORAL at 21:39

## 2017-09-21 RX ADMIN — HYDROMORPHONE HYDROCHLORIDE 1 MG: 1 INJECTION, SOLUTION INTRAMUSCULAR; INTRAVENOUS; SUBCUTANEOUS at 11:14

## 2017-09-21 RX ADMIN — PIPERACILLIN SODIUM,TAZOBACTAM SODIUM 3.38 G: 3; .375 INJECTION, POWDER, FOR SOLUTION INTRAVENOUS at 23:11

## 2017-09-21 RX ADMIN — HYDROCODONE BITARTRATE AND ACETAMINOPHEN 1 TABLET: 7.5; 325 TABLET ORAL at 03:46

## 2017-09-21 RX ADMIN — HYDROMORPHONE HYDROCHLORIDE 1 MG: 1 INJECTION, SOLUTION INTRAMUSCULAR; INTRAVENOUS; SUBCUTANEOUS at 08:33

## 2017-09-21 RX ADMIN — VANCOMYCIN HYDROCHLORIDE 1250 MG: 10 INJECTION, POWDER, LYOPHILIZED, FOR SOLUTION INTRAVENOUS at 03:47

## 2017-09-21 RX ADMIN — PIPERACILLIN SODIUM,TAZOBACTAM SODIUM 3.38 G: 3; .375 INJECTION, POWDER, FOR SOLUTION INTRAVENOUS at 08:33

## 2017-09-21 RX ADMIN — HYDROMORPHONE HYDROCHLORIDE 1 MG: 1 INJECTION, SOLUTION INTRAMUSCULAR; INTRAVENOUS; SUBCUTANEOUS at 05:51

## 2017-09-21 RX ADMIN — PIPERACILLIN SODIUM,TAZOBACTAM SODIUM 3.38 G: 3; .375 INJECTION, POWDER, FOR SOLUTION INTRAVENOUS at 14:45

## 2017-09-21 RX ADMIN — Medication 10 ML: at 14:46

## 2017-09-21 RX ADMIN — HYDROMORPHONE HYDROCHLORIDE 1 MG: 1 INJECTION, SOLUTION INTRAMUSCULAR; INTRAVENOUS; SUBCUTANEOUS at 19:03

## 2017-09-21 RX ADMIN — VANCOMYCIN HYDROCHLORIDE 1250 MG: 10 INJECTION, POWDER, LYOPHILIZED, FOR SOLUTION INTRAVENOUS at 16:57

## 2017-09-21 RX ADMIN — HYDROMORPHONE HYDROCHLORIDE 1 MG: 1 INJECTION, SOLUTION INTRAMUSCULAR; INTRAVENOUS; SUBCUTANEOUS at 14:45

## 2017-09-21 RX ADMIN — ENOXAPARIN SODIUM 40 MG: 40 INJECTION SUBCUTANEOUS at 21:39

## 2017-09-21 NOTE — INTERDISCIPLINARY ROUNDS
Interdisciplinary team rounds were held 9/21/2017 with the following team members:Care Management, Nursing, Pharmacy, Physical Therapy and Physician. Plan of care discussed. See clinical pathway and/or care plan for interventions and desired outcomes.

## 2017-09-21 NOTE — PROGRESS NOTES
Progress Note    Patient: Jere Jimenes MRN: [id number]  SSN: [id number]    YOB: 1960  Age: 55 y.o. Sex: male      Admit Date: 9/18/2017    LOS: 3 days      PROCEDURE:  9/20/2017  Open left first toe amputation      Subjective:     Patient reports mild-moderate pain since anesthesia has cleared, notes past history of addiction, asks about non-narcotic pain options. Tolerating diet. Has mobilized in room. Objective:     Vitals:    09/20/17 1921 09/20/17 2347 09/21/17 0400 09/21/17 0711   BP: 132/89 126/89 125/86 (!) 131/91   Pulse: 68 91 67 (!) 52   Resp: 17 17 17 16   Temp: 97.9 °F (36.6 °C) 98.3 °F (36.8 °C) 98.4 °F (36.9 °C) 98.4 °F (36.9 °C)   SpO2: 97% 96% 96% 96%   Weight:       Height:            Intake and Output:  Current Shift:    Last three shifts: 09/19 1901 - 09/21 0700  In: 1000 [P.O.:600; I.V.:400]  Out: 503 [Urine:500]    Physical Exam:   GENERAL: alert, cooperative, no distress  EYE: EOM intact  LUNG: clear to auscultation bilaterally  HEART: regular rate and rhythm  ABDOMEN: soft, non-tender, bowel sounds normoactive  EXTREMITIES: s/p left great toe amputation, OR bandage with small amount of dried serosanguineous staining removed, amp site with slight, slow bloody ooze, no purulence; wound redressed with xeroform gauze, bolstered by 4x4, wrapped in Kerlix  NEUROLOGIC: no focal deficits  PSYCHIATRIC: appropriate      Lab/Data Review: All lab results for the last 24 hours reviewed. Hepatitis panel negative, HIV screen non-reactive      Assessment / Plan:      Active Problems:    Infection of toe (9/18/2017)      Add Ultram to pain control options  ABX - vancomycin, Zosyn  Blood cultures - no growth @ 3d  Plan for revision and closure of left 1st toe amp site tomorrow, 9/22/2017  - NPO after MN  - consent  Further plan per Dr. Enrique Leslie      Signed By: Matias Mendoza PA-C    September 21, 2017      Physician Assistant with Vascular Surgery Blanca Gustafson MD / Rosario De La Cruz Matt Call MD / Neil Blunt MD

## 2017-09-21 NOTE — PROGRESS NOTES
Problem: Mobility Impaired (Adult and Pediatric)  Goal: *Acute Goals and Plan of Care (Insert Text)  DISCHARGE GOALS: ALL GOALS MET   (1.)Mr. Mcpherson will move from supine to sit and sit to supine , scoot up and down and roll side to side with INDEPENDENT within 1 day(s). GOAL MET 9/21/2017   (2.)Mr. Mcpherson will transfer from bed to chair and chair to bed with INDEPENDENT using the least restrictive device within 1 day(s). GOAL MET 9/21/2017   (3.)Mr. Mcpherson will ambulate with INDEPENDENT for 200 feet with the least restrictive device within 1 day(s). GOAL MET 9/21/2017       PHYSICAL THERAPY: INITIAL ASSESSMENT, DISCHARGE, TREATMENT DAY: DAY OF ASSESSMENT, AM 9/21/2017  INPATIENT: Hospital Day: 4  Payor: [facility] MEDICARE / Plan: [place] [employer] [facility] MEDICARE / Product Type: [employer] Care Medicare /      NAME/AGE/GENDER: Lon Malloy is a 55 y.o. male  PRIMARY DIAGNOSIS: Gangrene ([facility] [address] Shirleyann Decree  Gangrene ([facility] [address] Shirleyann Decree <principal problem not specified> <principal problem not specified>  Procedure(s) (LRB):  LEFT 1ST TOE AMPUTATION/ CHOICE ANES / ROOM 604 (Left)  1 Day Post-Op  ICD-10: Treatment Diagnosis:       · Generalized Muscle Weakness (M62.81)  · Difficulty in walking, Not elsewhere classified (R26.2)   Precaution/Allergies:  Review of patient's allergies indicates no known allergies. ASSESSMENT:      Mr. Ester Tate presents supine in bed, agreeable to therapy. At baseline, he lives with his wife in a 1 story home. He is completely independent. He performed all mobility independently and ambulated 200 feet in hallway -- he places weight through his heel only and with decreased pressure (shortens step on R side). All goals met. Will DC this session. RECOMMENDED REHABILITATION/EQUIPMENT: (at time of discharge pending progress): Due to the probability of continued deficits (see above) this patient will likely need continued skilled physical therapy after discharge.   Equipment:   · None at this time                   HISTORY:   History of Present Injury/Illness (Reason for Referral):  See assessment  Past Medical History/Comorbidities:   Mr. Laron Hinton  has a past medical history of Hypertension. Mr. Laron Hinton  has no past surgical history on file. Social History/Living Environment:   Home Environment: Private residence  One/Two Story Residence: One story  Living Alone: No  Support Systems: Friends \ neighbors, Family member(s)  Patient Expects to be Discharged to[de-identified] Private residence  Current DME Used/Available at Home: Crutches  Prior Level of Function/Work/Activity:  Independent  Personal Factors:          Sex:  male        Age:  55 y.o. Number of Personal Factors/Comorbidities that affect the Plan of Care: 0: LOW COMPLEXITY   EXAMINATION:   Most Recent Physical Functioning:   Gross Assessment:  AROM: Within functional limits  PROM: Within functional limits  Strength: Within functional limits  Coordination: Within functional limits  Tone: Normal  Sensation: Intact               Posture:  Posture (WDL): Exceptions to WDL  Posture Assessment: Forward head  Balance:  Sitting: Intact  Standing: Intact Bed Mobility:  Rolling: Independent  Supine to Sit: Independent  Scooting: Independent  Wheelchair Mobility:     Transfers:  Sit to Stand: Independent  Stand to Sit: Independent  Bed to Chair: Independent  Gait:     Distance (ft): 200 Feet (ft)       Body Structures Involved:  1. Bones  2. Joints  3. Muscles  4. Ligaments Body Functions Affected:  1. Neuromusculoskeletal  2. Movement Related Activities and Participation Affected:  1. Mobility  2. Self Care   Number of elements that affect the Plan of Care: 4+: HIGH COMPLEXITY   CLINICAL PRESENTATION:   Presentation: Stable and uncomplicated: LOW COMPLEXITY   CLINICAL DECISION MAKIN [facility] [address] [address] AM-PAC 6 Clicks   Basic Mobility Inpatient Short Form  How much difficulty does the patient currently have. .. Unable A Lot A Little None   1.   Turning over in bed (including adjusting bedclothes, sheets and blankets)? [ ] 1   [ ] 2   [ ] 3   [X] 4   2. Sitting down on and standing up from a chair with arms ( e.g., wheelchair, bedside commode, etc.)   [ ] 1   [ ] 2   [ ] 3   [X] 4   3. Moving from lying on back to sitting on the side of the bed? [ ] 1   [ ] 2   [ ] 3   [X] 4   How much help from another person does the patient currently need. .. Total A Lot A Little None   4. Moving to and from a bed to a chair (including a wheelchair)? [ ] 1   [ ] 2   [ ] 3   [X] 4   5. Need to walk in hospital room? [ ] 1   [ ] 2   [ ] 3   [X] 4   6. Climbing 3-5 steps with a railing? [ ] 1   [ ] 2   [ ] 3   [X] 4   © 2007, Trustees of [address] [place] [address], under license to [employer]. All rights reserved    Score:  Initial: 24 Most Recent: X (Date: -- )     Interpretation of Tool:  Represents activities that are increasingly more difficult (i.e. Bed mobility, Transfers, Gait). Score 24 23 22-20 19-15 14-10 9-7 6       Modifier CH CI CJ CK CL CM CN         · Mobility - Walking and Moving Around:               - CURRENT STATUS:    CH - 0% impaired, limited or restricted               - GOAL STATUS:           CH - 0% impaired, limited or restricted               - D/C STATUS:                       CH - 0% impaired, limited or restricted  Payor: [facility] MEDICARE / Plan: [facility] MEDICARE / Product Type: Managed Care Medicare /       Medical Necessity:     · Skilled intervention continues to be required due to None required. Reason for Services/Other Comments:      Use of outcome tool(s) and clinical judgement create a POC that gives a: Clear prediction of patient's progress: LOW COMPLEXITY                 TREATMENT:   (In addition to Assessment/Re-Assessment sessions the following treatments were rendered)   Pre-treatment Symptoms/Complaints: Shahram Maciel verbalized understanding of role of PT and POC.       Pain: Initial:   Pain Intensity 1: 10  Pain Location 1: Toe (comment which one)  Pain Orientation 1: Left  Pain Intervention(s) 1: Ambulation/Increased Activity  Post Session:  10/10      Therapeutic Activity: (    8 minutes): Therapeutic activities including Bed transfers, Chair transfers and Ambulation on level ground to improve mobility, strength, balance and coordination. Required no   to promote static and dynamic balance in standing. Braces/Orthotics/Lines/Etc:   · O2 Device: Room air  Treatment/Session Assessment:    · Response to Treatment:  Hallie Oliva verbalized understanding of role of PT and POC. ·   · Interdisciplinary Collaboration:  · Physical Therapist  · Registered Nurse  · After treatment position/precautions:  · Up in chair  · Bed/Chair-wheels locked  · Bed in low position  · Call light within reach  · RN notified  · Compliance with Program/Exercises: Will assess as treatment progresses.      Total Treatment Duration:  PT Patient Time In/Time Out  Time In: 1140  Time Out: 910 [facility] Leonidas Barragan DPT

## 2017-09-21 NOTE — PROGRESS NOTES
Hourly rounds were completed. The patient reported that he felt that the numbness from his toe wore off and that he was in pain. The patient received pain medications during the shift.  The patient received diluadid and norco.

## 2017-09-21 NOTE — PROGRESS NOTES
Hospitalist Progress Note     Admit Date:  2017  1:09 PM   Name:  Shahram Maciel   Age:  55 y.o.  :  1960   MRN:  [id number]   PCP:  None  Treatment Team: Attending Provider: Joshua Vera MD; Consulting Provider: Tabitha Norman MD; Utilization Review: [place]    Subjective:         Mr. Gene Hoff is a 56 yo male with PMH of tobacco use admitted with left great toe infection/gangrene. He was seen in the ED  s/p I and D and sent home on oral clindamycin to return with worsening complaint. Xray left foot with gas in great toe. Day 3 vancomycin and zosyn, BC NGTD. Vascular consulted and STEVEN ordered with minimal disease. He had 17 left great toe amputation due to gangrene with plans for closure and I&D .      still with some pain, diet tolerant, no BM change, no dyspnea or cough     Objective:     Patient Vitals for the past 24 hrs:   Temp Pulse Resp BP SpO2   17 0711 98.4 °F (36.9 °C) (!) 52 16 (!) 131/91 96 %   17 0400 98.4 °F (36.9 °C) 67 17 125/86 96 %   17 2347 98.3 °F (36.8 °C) 91 17 126/89 96 %   17 1921 97.9 °F (36.6 °C) 68 17 132/89 97 %   17 1456 97.9 °F (36.6 °C) 93 16 (!) 130/91 96 %   17 1020 97.5 °F (36.4 °C) (!) 56 16 122/82 96 %     Oxygen Therapy  O2 Sat (%): 96 % (17 0711)  Pulse via Oximetry: 51 beats per minute (17 08)  O2 Device: Room air (17)  O2 Flow Rate (L/min): 3 l/min (17 0748)    Intake/Output Summary (Last 24 hours) at 17 0916  Last data filed at 17 1833   Gross per 24 hour   Intake              600 ml   Output              500 ml   Net              100 ml         General:    Well nourished. Alert. CV:   RRR. No murmur, rub, or gallop. Lungs:   Clear to auscultation bilaterally. No wheezing, rhonchi, or rales. Abdomen:   Soft, nontender, nondistended. Skin:     No rashes or jaundice.      Data Review:  I have reviewed all labs, meds, telemetry events, and studies from the last 24 hours.     Recent Results (from the past 24 hour(s))   Jermaine Hearing    Collection Time: 09/20/17  4:08 PM   Result Value Ref Range    Vancomycin,trough 11.9 5 - 20 ug/mL   CREATININE    Collection Time: 09/20/17  4:08 PM   Result Value Ref Range    Creatinine 0.88 0.8 - 1.5 MG/DL        All Micro Results     Procedure Component Value Units Date/Time    CULTURE, BLOOD [[id number]] Collected:  09/18/17 1510    Order Status:  Completed Specimen:  Blood from Blood Updated:  09/21/17 0659     Special Requests: --        LEFT  Antecubital       Culture result: NO GROWTH 3 DAYS       CULTURE, BLOOD [[id number]] Collected:  09/18/17 1518    Order Status:  Completed Specimen:  Blood from Blood Updated:  09/21/17 0659     Special Requests: --        RIGHT  FOREARM       Culture result: NO GROWTH 3 DAYS             Current Meds:  Current Facility-Administered Medications   Medication Dose Route Frequency    traMADol (ULTRAM) tablet 50 mg  50 mg Oral Q6H PRN    lidocaine (XYLOCAINE) 10 mg/mL (1 %) injection 0.1 mL  0.1 mL SubCUTAneous PRN    sodium chloride (NS) flush 5-10 mL  5-10 mL IntraVENous Q8H    sodium chloride (NS) flush 5-10 mL  5-10 mL IntraVENous PRN    midazolam (VERSED) injection 2 mg  2 mg IntraVENous ONCE PRN    nicotine (NICODERM CQ) 7 mg/24 hr patch 1 Patch  1 Patch TransDERmal DAILY    enalaprilat (VASOTEC) injection 0.625 mg  0.625 mg IntraVENous Q6H PRN    sodium chloride (NS) flush 5-10 mL  5-10 mL IntraVENous Q8H    sodium chloride (NS) flush 5-10 mL  5-10 mL IntraVENous PRN    acetaminophen (TYLENOL) tablet 650 mg  650 mg Oral Q4H PRN    HYDROcodone-acetaminophen (NORCO) 7.5-325 mg per tablet 1 Tab  1 Tab Oral Q4H PRN    HYDROmorphone (PF) (DILAUDID) injection 1 mg  1 mg IntraVENous Q4H PRN    naloxone (NARCAN) injection 0.4 mg  0.4 mg IntraVENous PRN    diphenhydrAMINE (BENADRYL) injection 12.5 mg  12.5 mg IntraVENous Q4H PRN    ondansetron (ZOFRAN) injection 4 mg  4 mg IntraVENous Q4H PRN    bisacodyl (DULCOLAX) tablet 5 mg  5 mg Oral DAILY PRN    vancomycin (VANCOCIN) 1250 mg in  ml infusion  1,250 mg IntraVENous Q12H    enoxaparin (LOVENOX) injection 40 mg  40 mg SubCUTAneous Q24H    piperacillin-tazobactam (ZOSYN) 3.375 g in 0.9% sodium chloride (MBP/ADV) 100 mL  3.375 g IntraVENous Q8H       Other Studies (last 24 hours):  No results found.     Assessment and Plan:     Hospital Problems as of 9/21/2017  Never Reviewed          Codes Class Noted - Resolved POA    Infection of toe ICD-10-CM: L08.9  ICD-9-CM: 686.9  9/18/2017 - Present Unknown              PLAN:    · Appreciate vascular, back to OR tomorrow   · Continue present antibiotics and followup micro   · Needs tobacco use cessation and claims he will stop, has nicotine patch   · Adjust prn pain meds   · PT/OT    DC planning/Dispo:  pending  DVT ppx:  lovenox    Signed:  Michelle Salas MD

## 2017-09-22 ENCOUNTER — ANESTHESIA (OUTPATIENT)
Dept: SURGERY | Age: 57
DRG: 580 | End: 2017-09-22
Payer: MEDICARE

## 2017-09-22 LAB
MM INDURATION POC: NORMAL MM (ref 0–5)
PPD POC: NORMAL NEGATIVE

## 2017-09-22 PROCEDURE — 77030002916 HC SUT ETHLN J&J -A: Performed by: SURGERY

## 2017-09-22 PROCEDURE — 74011250636 HC RX REV CODE- 250/636

## 2017-09-22 PROCEDURE — 77030031139 HC SUT VCRL2 J&J -A: Performed by: SURGERY

## 2017-09-22 PROCEDURE — 65270000029 HC RM PRIVATE

## 2017-09-22 PROCEDURE — 76010010054 HC POST OP PAIN BLOCK: Performed by: SURGERY

## 2017-09-22 PROCEDURE — 77030002996 HC SUT SLK J&J -A: Performed by: SURGERY

## 2017-09-22 PROCEDURE — 76060000032 HC ANESTHESIA 0.5 TO 1 HR: Performed by: SURGERY

## 2017-09-22 PROCEDURE — 74011250636 HC RX REV CODE- 250/636: Performed by: EMERGENCY MEDICINE

## 2017-09-22 PROCEDURE — 76010000138 HC OR TIME 0.5 TO 1 HR: Performed by: SURGERY

## 2017-09-22 PROCEDURE — 76942 ECHO GUIDE FOR BIOPSY: CPT | Performed by: SURGERY

## 2017-09-22 PROCEDURE — 74011250636 HC RX REV CODE- 250/636: Performed by: INTERNAL MEDICINE

## 2017-09-22 PROCEDURE — 74011250637 HC RX REV CODE- 250/637: Performed by: INTERNAL MEDICINE

## 2017-09-22 PROCEDURE — 97165 OT EVAL LOW COMPLEX 30 MIN: CPT

## 2017-09-22 PROCEDURE — 77030018836 HC SOL IRR NACL ICUM -A: Performed by: SURGERY

## 2017-09-22 PROCEDURE — 74011250636 HC RX REV CODE- 250/636: Performed by: ANESTHESIOLOGY

## 2017-09-22 PROCEDURE — 77030011640 HC PAD GRND REM COVD -A: Performed by: SURGERY

## 2017-09-22 PROCEDURE — 77030003602 HC NDL NRV BLK BBMI -B: Performed by: ANESTHESIOLOGY

## 2017-09-22 PROCEDURE — 74011000258 HC RX REV CODE- 258: Performed by: INTERNAL MEDICINE

## 2017-09-22 PROCEDURE — 76210000016 HC OR PH I REC 1 TO 1.5 HR: Performed by: SURGERY

## 2017-09-22 PROCEDURE — 0Y6Q0Z0 DETACHMENT AT LEFT 1ST TOE, COMPLETE, OPEN APPROACH: ICD-10-PCS | Performed by: SURGERY

## 2017-09-22 RX ORDER — SODIUM CHLORIDE, SODIUM LACTATE, POTASSIUM CHLORIDE, CALCIUM CHLORIDE 600; 310; 30; 20 MG/100ML; MG/100ML; MG/100ML; MG/100ML
75 INJECTION, SOLUTION INTRAVENOUS CONTINUOUS
Status: DISCONTINUED | OUTPATIENT
Start: 2017-09-22 | End: 2017-09-22 | Stop reason: HOSPADM

## 2017-09-22 RX ORDER — PROPOFOL 10 MG/ML
INJECTION, EMULSION INTRAVENOUS AS NEEDED
Status: DISCONTINUED | OUTPATIENT
Start: 2017-09-22 | End: 2017-09-22 | Stop reason: HOSPADM

## 2017-09-22 RX ORDER — SODIUM CHLORIDE 0.9 % (FLUSH) 0.9 %
5-10 SYRINGE (ML) INJECTION EVERY 8 HOURS
Status: DISCONTINUED | OUTPATIENT
Start: 2017-09-22 | End: 2017-09-23 | Stop reason: SDUPTHER

## 2017-09-22 RX ORDER — MIDAZOLAM HYDROCHLORIDE 1 MG/ML
2 INJECTION, SOLUTION INTRAMUSCULAR; INTRAVENOUS
Status: DISCONTINUED | OUTPATIENT
Start: 2017-09-22 | End: 2017-09-23 | Stop reason: HOSPADM

## 2017-09-22 RX ORDER — OXYCODONE HYDROCHLORIDE 5 MG/1
5 TABLET ORAL
Status: DISCONTINUED | OUTPATIENT
Start: 2017-09-22 | End: 2017-09-22 | Stop reason: HOSPADM

## 2017-09-22 RX ORDER — SODIUM CHLORIDE, SODIUM LACTATE, POTASSIUM CHLORIDE, CALCIUM CHLORIDE 600; 310; 30; 20 MG/100ML; MG/100ML; MG/100ML; MG/100ML
INJECTION, SOLUTION INTRAVENOUS
Status: DISCONTINUED | OUTPATIENT
Start: 2017-09-22 | End: 2017-09-22 | Stop reason: HOSPADM

## 2017-09-22 RX ORDER — FENTANYL CITRATE 50 UG/ML
100 INJECTION, SOLUTION INTRAMUSCULAR; INTRAVENOUS AS NEEDED
Status: DISCONTINUED | OUTPATIENT
Start: 2017-09-22 | End: 2017-09-23 | Stop reason: HOSPADM

## 2017-09-22 RX ORDER — ONDANSETRON 2 MG/ML
4 INJECTION INTRAMUSCULAR; INTRAVENOUS ONCE
Status: DISCONTINUED | OUTPATIENT
Start: 2017-09-22 | End: 2017-09-22 | Stop reason: HOSPADM

## 2017-09-22 RX ORDER — SODIUM CHLORIDE 0.9 % (FLUSH) 0.9 %
5-10 SYRINGE (ML) INJECTION AS NEEDED
Status: DISCONTINUED | OUTPATIENT
Start: 2017-09-22 | End: 2017-09-23 | Stop reason: SDUPTHER

## 2017-09-22 RX ORDER — LIDOCAINE HYDROCHLORIDE 10 MG/ML
0.1 INJECTION INFILTRATION; PERINEURAL AS NEEDED
Status: DISCONTINUED | OUTPATIENT
Start: 2017-09-22 | End: 2017-09-23 | Stop reason: HOSPADM

## 2017-09-22 RX ORDER — SODIUM CHLORIDE 0.9 % (FLUSH) 0.9 %
5-10 SYRINGE (ML) INJECTION AS NEEDED
Status: DISCONTINUED | OUTPATIENT
Start: 2017-09-22 | End: 2017-09-22 | Stop reason: HOSPADM

## 2017-09-22 RX ORDER — DIPHENHYDRAMINE HYDROCHLORIDE 50 MG/ML
12.5 INJECTION, SOLUTION INTRAMUSCULAR; INTRAVENOUS ONCE
Status: DISCONTINUED | OUTPATIENT
Start: 2017-09-22 | End: 2017-09-22 | Stop reason: HOSPADM

## 2017-09-22 RX ORDER — ACETAMINOPHEN 500 MG
500 TABLET ORAL ONCE
Status: DISCONTINUED | OUTPATIENT
Start: 2017-09-22 | End: 2017-09-22 | Stop reason: HOSPADM

## 2017-09-22 RX ORDER — OXYCODONE HYDROCHLORIDE 5 MG/1
10 TABLET ORAL
Status: DISCONTINUED | OUTPATIENT
Start: 2017-09-22 | End: 2017-09-22 | Stop reason: HOSPADM

## 2017-09-22 RX ORDER — NALOXONE HYDROCHLORIDE 0.4 MG/ML
0.1 INJECTION, SOLUTION INTRAMUSCULAR; INTRAVENOUS; SUBCUTANEOUS AS NEEDED
Status: DISCONTINUED | OUTPATIENT
Start: 2017-09-22 | End: 2017-09-22 | Stop reason: HOSPADM

## 2017-09-22 RX ORDER — SODIUM CHLORIDE, SODIUM LACTATE, POTASSIUM CHLORIDE, CALCIUM CHLORIDE 600; 310; 30; 20 MG/100ML; MG/100ML; MG/100ML; MG/100ML
1000 INJECTION, SOLUTION INTRAVENOUS CONTINUOUS
Status: DISCONTINUED | OUTPATIENT
Start: 2017-09-22 | End: 2017-09-23

## 2017-09-22 RX ORDER — PROPOFOL 10 MG/ML
INJECTION, EMULSION INTRAVENOUS
Status: DISCONTINUED | OUTPATIENT
Start: 2017-09-22 | End: 2017-09-22 | Stop reason: HOSPADM

## 2017-09-22 RX ORDER — HYDROMORPHONE HYDROCHLORIDE 2 MG/ML
0.5 INJECTION, SOLUTION INTRAMUSCULAR; INTRAVENOUS; SUBCUTANEOUS
Status: DISCONTINUED | OUTPATIENT
Start: 2017-09-22 | End: 2017-09-22 | Stop reason: HOSPADM

## 2017-09-22 RX ADMIN — HYDROCODONE BITARTRATE AND ACETAMINOPHEN 1 TABLET: 7.5; 325 TABLET ORAL at 23:05

## 2017-09-22 RX ADMIN — PIPERACILLIN SODIUM,TAZOBACTAM SODIUM 3.38 G: 3; .375 INJECTION, POWDER, FOR SOLUTION INTRAVENOUS at 21:42

## 2017-09-22 RX ADMIN — HYDROMORPHONE HYDROCHLORIDE 1 MG: 1 INJECTION, SOLUTION INTRAMUSCULAR; INTRAVENOUS; SUBCUTANEOUS at 17:57

## 2017-09-22 RX ADMIN — HYDROMORPHONE HYDROCHLORIDE 1 MG: 1 INJECTION, SOLUTION INTRAMUSCULAR; INTRAVENOUS; SUBCUTANEOUS at 12:12

## 2017-09-22 RX ADMIN — HYDROMORPHONE HYDROCHLORIDE 1 MG: 1 INJECTION, SOLUTION INTRAMUSCULAR; INTRAVENOUS; SUBCUTANEOUS at 08:07

## 2017-09-22 RX ADMIN — Medication 10 ML: at 21:48

## 2017-09-22 RX ADMIN — PROPOFOL 70 MG: 10 INJECTION, EMULSION INTRAVENOUS at 15:40

## 2017-09-22 RX ADMIN — PROPOFOL 100 MCG/KG/MIN: 10 INJECTION, EMULSION INTRAVENOUS at 15:40

## 2017-09-22 RX ADMIN — VANCOMYCIN HYDROCHLORIDE 1250 MG: 10 INJECTION, POWDER, LYOPHILIZED, FOR SOLUTION INTRAVENOUS at 14:14

## 2017-09-22 RX ADMIN — MIDAZOLAM 2 MG: 1 INJECTION INTRAMUSCULAR; INTRAVENOUS at 14:00

## 2017-09-22 RX ADMIN — ENOXAPARIN SODIUM 40 MG: 40 INJECTION SUBCUTANEOUS at 21:40

## 2017-09-22 RX ADMIN — Medication 10 ML: at 05:46

## 2017-09-22 RX ADMIN — HYDROMORPHONE HYDROCHLORIDE 1 MG: 1 INJECTION, SOLUTION INTRAMUSCULAR; INTRAVENOUS; SUBCUTANEOUS at 21:39

## 2017-09-22 RX ADMIN — VANCOMYCIN HYDROCHLORIDE 1250 MG: 10 INJECTION, POWDER, LYOPHILIZED, FOR SOLUTION INTRAVENOUS at 02:12

## 2017-09-22 RX ADMIN — PIPERACILLIN SODIUM,TAZOBACTAM SODIUM 3.38 G: 3; .375 INJECTION, POWDER, FOR SOLUTION INTRAVENOUS at 06:04

## 2017-09-22 RX ADMIN — Medication 10 ML: at 21:40

## 2017-09-22 RX ADMIN — Medication 10 ML: at 05:47

## 2017-09-22 RX ADMIN — SODIUM CHLORIDE, SODIUM LACTATE, POTASSIUM CHLORIDE, CALCIUM CHLORIDE: 600; 310; 30; 20 INJECTION, SOLUTION INTRAVENOUS at 15:32

## 2017-09-22 RX ADMIN — HYDROCODONE BITARTRATE AND ACETAMINOPHEN 1 TABLET: 7.5; 325 TABLET ORAL at 08:58

## 2017-09-22 RX ADMIN — HYDROMORPHONE HYDROCHLORIDE 1 MG: 1 INJECTION, SOLUTION INTRAMUSCULAR; INTRAVENOUS; SUBCUTANEOUS at 04:43

## 2017-09-22 NOTE — ANESTHESIA PREPROCEDURE EVALUATION
Anesthetic History               Review of Systems / Medical History  Patient summary reviewed and pertinent labs reviewed    Pulmonary          Smoker         Neuro/Psych   Within defined limits           Cardiovascular    Hypertension              Exercise tolerance: >4 METS     GI/Hepatic/Renal  Within defined limits              Endo/Other  Within defined limits           Other Findings   Comments: Returning to OR after toe amputation for gangrene for IND and closure. skeletonized from chart, requires physical exam and patient confirmation. Patient previously had Pop/saph blocks with propofol tiva. Physical Exam    Airway  Mallampati: I  TM Distance: 4 - 6 cm  Neck ROM: normal range of motion   Mouth opening: Normal     Cardiovascular    Rhythm: regular  Rate: normal         Dental    Dentition: Edentulous     Pulmonary  Breath sounds clear to auscultation               Abdominal         Other Findings            Anesthetic Plan    ASA: 2  Anesthesia type: total IV anesthesia      Post-op pain plan if not by surgeon: peripheral nerve block single    Induction: Intravenous  Anesthetic plan and risks discussed with: Patient       He had normal feeling in his foot/toe prior to the blister and infection.

## 2017-09-22 NOTE — ANESTHESIA PROCEDURE NOTES
Peripheral Block    Start time: 9/22/2017 2:00 PM  End time: 9/22/2017 2:04 PM  Performed by: Jerome Tirado  Authorized by: Jerome Tirado       Pre-procedure:    Indications: at surgeon's request and post-op pain management    Preanesthetic Checklist: patient identified, risks and benefits discussed, site marked, timeout performed, anesthesia consent given and patient being monitored    Timeout Time: 14:00          Block Type:   Block Type:  Popliteal  Laterality:  Left  Monitoring:  Responsive to questions, continuous pulse ox, oxygen, frequent vital sign checks and heart rate  Injection Technique:  Single shot  Procedures: ultrasound guided and nerve stimulator    Prep: chlorhexidine    Location:  Lower thigh  Needle Type:  Stimuplex  Needle Gauge:  21 G  Needle Localization:  Ultrasound guidance  Medication Injected:  0.5%  ropivacaine  Adds:  Epi 1:200K  Volume (mL):  20  Add'l Medication Injected:  2.0%  mepivacaine  Adds:  Epi 1:200K  Volume (mL):  15    Assessment:  Number of attempts:  1  Injection Assessment:  Incremental injection every 5 mL, negative aspiration for CSF, no paresthesia, ultrasound image on chart, no intravascular symptoms, negative aspiration for blood and local visualized surrounding nerve on ultrasound  Patient tolerance:  Patient tolerated the procedure well with no immediate complications

## 2017-09-22 NOTE — PROGRESS NOTES
TRANSFER - IN REPORT:    Verbal report received from reid rn(name) on Natasha Morris  being received from pacu(unit) for ordered procedure      Report consisted of patients Situation, Background, Assessment and   Recommendations(SBAR). Information from the following report(s) SBAR was reviewed with the receiving nurse. Opportunity for questions and clarification was provided. Assessment completed upon patients arrival to unit and care assumed.

## 2017-09-22 NOTE — ADT AUTH CERT NOTES
LOC:Acute Adult-General Surgical (9/21/2017) by Skip Bower        Review Entered Review Status       9/21/2017 In Primary       Details         REVIEW SUMMARY     Patient: ROSINA LIVINGSTON  Review Number: [id number]  Review Status: In Primary     Condition Specific: Yes     Condition Level Of Care Code: ACUTE  Condition Level Of Care Description: Acute        OUTCOMES  Outcome Type: Primary           REVIEW DETAILS     Service Date: 09/21/2017  Admit Date: 09/18/2017  Product: Jazmine Ovalle Adult  Subset: General Surgical      (Symptom or finding within 24h)         (Excludes PO medications unless noted)          [X] Select Day, One:              [X] Post-op Day 1, One:                  [X] ACUTE, One:                      [X] Moderate or long stay surgery and expected post-op course, All:                          [X] DVT prophylaxis or patient ambulatory                          [X] Hydration or nutrition, One:                              [X] Advancing diet as tolerated or nutritional route established                          [X] Mobility advancing as tolerated                          [X] Pain assessment, One:                              [X] Pain controlled                              ~--Admin, IQ Admin Admin on 09- 11:15 AM--~                              Progress Note                              Patient reports mild-moderate pain since anesthesia has cleared, notes past history of addiction, asks about non-narcotic pain options. Tolerating diet. Has mobilized in room.                               EXAM:  EXTREMITIES: s/p left great toe amputation, OR bandage with small amount of dried serosanguineous staining removed, amp site with slight, slow bloody ooze, no purulence; wound redressed with xeroform gauze, bolstered by 4x4, wrapped in Kerlix                              T 98.4, /91, P 52, R 16, 02 SAT 96% RA                              C02 33,                               Assessment / Plan: INFECTION OF TOE. Add Ultram to pain control options                              ABX - vancomycin, Zosyn                              Blood cultures - no growth @ 3d                              Plan for revision and closure of left 1st toe amp site tomorrow, 9/22/2017                              - NPO after MN                              - consent                              Further plan per Dr. Poe Said                              PT/OT EVAL, LOVENOX SC Q 24 HRS, NORCO PO Q 4 HRS PRN X1 (2X/24 HRS), ZOSYN IV Q 8 HRS, VANCOMYCIN IV Q 12 HRS, DILAUDID 1 MG IV DC'D (REC'D 3X TODAY & 6X/24 HRS), REG DIET-NPO EXCEPT MEDS AFTER MN                     Version: InterQual® 2016.1  InterQual® and CareEnhance®  © 2016 [employer] and/or one of its subsidiaries. All Rights Reserved. CPT only © 2015 American Medical Association. All Rights Reserved.

## 2017-09-22 NOTE — ANESTHESIA POSTPROCEDURE EVALUATION
Post-Anesthesia Evaluation and Assessment    Patient: Karissa Dhillon MRN: [id number]  SSN: [id number]    YOB: 1960  Age: 55 y.o. Sex: male       Cardiovascular Function/Vital Signs  Visit Vitals    /88    Pulse (!) 48    Temp 36.6 °C (97.9 °F)    Resp 16    Ht 6' 1\" (1.854 m)    Wt 80.7 kg (178 lb)    SpO2 100%    BMI 23.48 kg/m2       Patient is status post total IV anesthesia anesthesia for Procedure(s):  LEFT FIRST TOE AMPUTATION REVISION AND CLOSURE . Nausea/Vomiting: None    Postoperative hydration reviewed and adequate. Pain:  Pain Scale 1: Numeric (0 - 10) (09/22/17 1620)  Pain Intensity 1: 0 (09/22/17 1620)   Managed    Neurological Status:   Neuro (WDL): Within Defined Limits (09/22/17 1620)  Neuro  Neurologic State: Alert (09/22/17 1620)  Orientation Level: Oriented X4 (09/22/17 1620)  Cognition: Follows commands (09/22/17 1620)  RLE Motor Response: Numbness (block) (09/22/17 1620)   Block working well    Mental Status and Level of Consciousness: Awake, alert    Pulmonary Status:   O2 Device: Room air (09/22/17 1625)   Adequate oxygenation and airway patent    Complications related to anesthesia: None    Post-anesthesia assessment completed.  No concerns    Signed By: Emerson Garcia MD     September 22, 2017

## 2017-09-22 NOTE — PROGRESS NOTES
Hospitalist Progress Note     Admit Date:  2017  1:09 PM   Name:  Symone Marley   Age:  55 y.o.  :  1960   MRN:  [id number]   PCP:  None  Treatment Team: Attending Provider: Lucy Altman MD; Consulting Provider: Christine Frey MD; Utilization Review: Noemy Churchill    Subjective:       Mr. Obi Madrigal is a 54 yo male with PMH of tobacco use admitted with left great toe infection/gangrene. He was seen in the ED  s/p I and D and sent home on oral clindamycin to return with worsening complaint. Xray left foot with gas in great toe. Day 4 vancomycin and zosyn, BC NGTD. Vascular consulted and STEVEN ordered with minimal disease. He had 17 left great toe amputation due to gangrene with plans for closure and I&D . Plans for home when stable     pain improved, NPO, no BM change, denies dyspnea or cough     Objective:     Patient Vitals for the past 24 hrs:   Temp Pulse Resp BP SpO2   17 98.5 °F (36.9 °C) (!) 57 16 135/84 97 %   17 0519 98.1 °F (36.7 °C) (!) 53 16 138/89 99 %   17 0010 97.2 °F (36.2 °C) (!) 54 16 (!) 151/96 97 %   17 2016 97.6 °F (36.4 °C) (!) 55 16 136/82 99 %   17 1440 98.4 °F (36.9 °C) (!) 59 16 146/90 96 %   17 1049 98.4 °F (36.9 °C) 60 16 (!) 140/91 96 %     Oxygen Therapy  O2 Sat (%): 97 % (17)  Pulse via Oximetry: 51 beats per minute (17)  O2 Device: Room air (17)  O2 Flow Rate (L/min): 3 l/min (17 0748)    Intake/Output Summary (Last 24 hours) at 17 09  Last data filed at 17 1805   Gross per 24 hour   Intake              600 ml   Output                0 ml   Net              600 ml         General:    Well nourished. Alert. CV:   RRR. No murmur, rub, or gallop. Lungs:   Clear to auscultation bilaterally. No wheezing, rhonchi, or rales. Abdomen:   Soft, nontender, nondistended. Skin:     No rashes or jaundice.  Left foot bandaged      Data Review:  I have reviewed all labs, meds, telemetry events, and studies from the last 24 hours. Recent Results (from the past 24 hour(s))   METABOLIC PANEL, BASIC    Collection Time: 09/21/17  9:30 AM   Result Value Ref Range    Sodium 140 136 - 145 mmol/L    Potassium 4.1 3.5 - 5.1 mmol/L    Chloride 103 98 - 107 mmol/L    CO2 33 (H) 21 - 32 mmol/L    Anion gap 4 (L) 7 - 16 mmol/L    Glucose 83 65 - 100 mg/dL    BUN 6 6 - 23 MG/DL    Creatinine 0.82 0.8 - 1.5 MG/DL    GFR est AA >60 >60 ml/min/1.73m2    GFR est non-AA >60 >60 ml/min/1.73m2    Calcium 9.3 8.3 - 10.4 MG/DL   CBC WITH AUTOMATED DIFF    Collection Time: 09/21/17  9:30 AM   Result Value Ref Range    WBC 5.5 4.3 - 11.1 K/uL    RBC 4.81 4.23 - 5.67 M/uL    HGB 14.8 13.6 - 17.2 g/dL    HCT 43.6 41.1 - 50.3 %    MCV 90.6 79.6 - 97.8 FL    MCH 30.8 26.1 - 32.9 PG    MCHC 33.9 31.4 - 35.0 g/dL    RDW 13.6 11.9 - 14.6 %    PLATELET 411 149 - 666 K/uL    MPV 9.3 (L) 10.8 - 14.1 FL    DF AUTOMATED      NEUTROPHILS 67 43 - 78 %    LYMPHOCYTES 20 13 - 44 %    MONOCYTES 11 4.0 - 12.0 %    EOSINOPHILS 2 0.5 - 7.8 %    BASOPHILS 0 0.0 - 2.0 %    IMMATURE GRANULOCYTES 0.2 0.0 - 5.0 %    ABS. NEUTROPHILS 3.6 1.7 - 8.2 K/UL    ABS. LYMPHOCYTES 1.1 0.5 - 4.6 K/UL    ABS. MONOCYTES 0.6 0.1 - 1.3 K/UL    ABS. EOSINOPHILS 0.1 0.0 - 0.8 K/UL    ABS. BASOPHILS 0.0 0.0 - 0.2 K/UL    ABS. IMM.  GRANS. 0.0 0.0 - 0.5 K/UL        All Micro Results     Procedure Component Value Units Date/Time    CULTURE, BLOOD [[id number]] Collected:  09/18/17 1510    Order Status:  Completed Specimen:  Blood from Blood Updated:  09/22/17 0616     Special Requests: --        LEFT  Antecubital       Culture result: NO GROWTH 4 DAYS       CULTURE, BLOOD [[id number]] Collected:  09/18/17 1518    Order Status:  Completed Specimen:  Blood from Blood Updated:  09/22/17 0616     Special Requests: --        RIGHT  FOREARM       Culture result: NO GROWTH 4 DAYS             Current Meds:  Current Facility-Administered Medications   Medication Dose Route Frequency    traMADol (ULTRAM) tablet 50 mg  50 mg Oral Q6H PRN    HYDROmorphone (PF) (DILAUDID) injection 1 mg  1 mg IntraVENous Q3H PRN    lidocaine (XYLOCAINE) 10 mg/mL (1 %) injection 0.1 mL  0.1 mL SubCUTAneous PRN    sodium chloride (NS) flush 5-10 mL  5-10 mL IntraVENous Q8H    sodium chloride (NS) flush 5-10 mL  5-10 mL IntraVENous PRN    midazolam (VERSED) injection 2 mg  2 mg IntraVENous ONCE PRN    nicotine (NICODERM CQ) 7 mg/24 hr patch 1 Patch  1 Patch TransDERmal DAILY    enalaprilat (VASOTEC) injection 0.625 mg  0.625 mg IntraVENous Q6H PRN    sodium chloride (NS) flush 5-10 mL  5-10 mL IntraVENous Q8H    sodium chloride (NS) flush 5-10 mL  5-10 mL IntraVENous PRN    acetaminophen (TYLENOL) tablet 650 mg  650 mg Oral Q4H PRN    HYDROcodone-acetaminophen (NORCO) 7.5-325 mg per tablet 1 Tab  1 Tab Oral Q4H PRN    naloxone (NARCAN) injection 0.4 mg  0.4 mg IntraVENous PRN    diphenhydrAMINE (BENADRYL) injection 12.5 mg  12.5 mg IntraVENous Q4H PRN    ondansetron (ZOFRAN) injection 4 mg  4 mg IntraVENous Q4H PRN    bisacodyl (DULCOLAX) tablet 5 mg  5 mg Oral DAILY PRN    vancomycin (VANCOCIN) 1250 mg in  ml infusion  1,250 mg IntraVENous Q12H    enoxaparin (LOVENOX) injection 40 mg  40 mg SubCUTAneous Q24H    piperacillin-tazobactam (ZOSYN) 3.375 g in 0.9% sodium chloride (MBP/ADV) 100 mL  3.375 g IntraVENous Q8H       Other Studies (last 24 hours):  No results found.     Assessment and Plan:     Hospital Problems as of 9/22/2017  Never Reviewed          Codes Class Noted - Resolved POA    Infection of toe ICD-10-CM: L08.9  ICD-9-CM: 686.9  9/18/2017 - Present Unknown              PLAN:    · Appreciate vascular, back to OR today  · Continue present antibiotics and followup micro   · Needs tobacco use cessation and claims he will stop, has nicotine patch   · Discharge planning soon     DC planning/Dispo:  pending  DVT ppx:  lovenox    Signed:  Enrique Hammond MD

## 2017-09-22 NOTE — PROGRESS NOTES
TRANSFER - OUT REPORT:    Verbal report given to [facility] rn(name) on Shahram Scot  being transferred to [facility](unit) for ordered procedure       Report consisted of patients Situation, Background, Assessment and   Recommendations(SBAR). Information from the following report(s) SBAR was reviewed with the receiving nurse. Lines:   Peripheral IV 09/21/17 Left Arm (Active)   Site Assessment Clean 9/22/2017  8:10 AM   Phlebitis Assessment 0 9/22/2017  8:10 AM   Infiltration Assessment 0 9/22/2017  8:10 AM   Dressing Status Clean 9/22/2017  8:10 AM   Dressing Type Transparent 9/22/2017  8:10 AM   Hub Color/Line Status Infusing 9/22/2017  8:10 AM   Alcohol Cap Used No 9/22/2017  3:10 AM        Opportunity for questions and clarification was provided.       Patient transported with:   zosyn and vancomycin

## 2017-09-22 NOTE — ANESTHESIA PROCEDURE NOTES
Peripheral Block    Start time: 9/22/2017 2:05 PM  End time: 9/22/2017 2:06 PM  Performed by: Britta Charles  Authorized by: Britta Charles       Pre-procedure: Indications: at surgeon's request and post-op pain management    Preanesthetic Checklist: patient identified, risks and benefits discussed, site marked, timeout performed, anesthesia consent given and patient being monitored    Timeout Time: 14:05          Block Type:   Block Type:   Adductor canal  Laterality:  Left  Monitoring:  Responsive to questions, continuous pulse ox, oxygen, frequent vital sign checks and heart rate  Injection Technique:  Single shot  Procedures: ultrasound guided    Patient Position: supine  Prep: chlorhexidine    Location:  Upper thigh  Needle Type:  Stimuplex  Needle Gauge:  21 G  Needle Localization:  Ultrasound guidance  Medication Injected:  0.5%  ropivacaine  Adds:  Epi 1:200K  Volume (mL):  10  Add'l Medication Injected:  2.0%  mepivacaine  Adds:  Epi 1:200K  Volume (mL):  5    Assessment:  Number of attempts:  1  Injection Assessment:  Incremental injection every 5 mL, negative aspiration for CSF, no paresthesia, ultrasound image on chart, no intravascular symptoms, negative aspiration for blood and local visualized surrounding nerve on ultrasound  Patient tolerance:  Patient tolerated the procedure well with no immediate complications

## 2017-09-22 NOTE — ROUTINE PROCESS
TRANSFER - OUT REPORT:    Verbal report given to Swetha LI on Michael Duarte  being transferred to [facility] for routine post - op       Report consisted of patients Situation, Background, Assessment and   Recommendations(SBAR). Information from the following report(s) SBAR, Kardex, OR Summary, Procedure Summary, Intake/Output and MAR was reviewed with the receiving nurse. Lines:   Peripheral IV 09/21/17 Left Arm (Active)   Site Assessment Clean, dry, & intact 9/22/2017  4:20 PM   Phlebitis Assessment 0 9/22/2017  4:20 PM   Infiltration Assessment 0 9/22/2017  4:20 PM   Dressing Status Clean, dry, & intact 9/22/2017  4:20 PM   Dressing Type Transparent;Tape 9/22/2017  4:20 PM   Hub Color/Line Status Infusing 9/22/2017  4:20 PM   Alcohol Cap Used No 9/22/2017  4:20 PM       Peripheral IV 09/22/17 Right Forearm (Active)   Site Assessment Clean, dry, & intact 9/22/2017  4:20 PM   Phlebitis Assessment 0 9/22/2017  4:20 PM   Infiltration Assessment 0 9/22/2017  4:20 PM   Dressing Status Clean, dry, & intact 9/22/2017  4:20 PM   Dressing Type Transparent;Tape 9/22/2017  4:20 PM   Hub Color/Line Status Infusing 9/22/2017  4:20 PM   Alcohol Cap Used No 9/22/2017  4:20 PM        Opportunity for questions and clarification was provided. Patient transported with room air. VTE prophylaxis orders have not been written for Michael Duarte. Patient and family given floor number and nurses name. Family updated re: pt status after security code verified.

## 2017-09-22 NOTE — PROGRESS NOTES
Seen in presence of female friend, discussed today's revision surgery, patient expressed understanding and had no further questions. Patient was marked for revision and closure of left 1st toe amputation today by Best Walton MD.    Aftercare discussed by Dr. Anna Marie Lucero, patient to limit activities, ambulate with heel-touch. Will need post-op shoe from materials prior to hospital discharge. Nathan Omer PA-C  Physician Assistant with Vascular Surgery Giles Diggs MD / Leilani Myers.  Jovani Lee MD / Cass Mckeon MD

## 2017-09-22 NOTE — BRIEF OP NOTE
BRIEF OPERATIVE NOTE    Date of Procedure: 9/22/2017   Preoperative Diagnosis: Gangrene ([facility] [address] [I96]  Postoperative Diagnosis: Gangrene ([facility] [address] Janyalex Connolly    Procedure(s):  LEFT FIRST TOE AMPUTATION REVISION AND CLOSURE / ROOM 604  Surgeon(s) and Role:     * Josette Manning MD - Primary         Assistant Staff:       Surgical Staff:  Circ-1: Ana Lofton RN  Circ-Relief: Ruth Casas RN  Scrub Tech-Relief: Caity Hudson  Event Time In   Incision Start 1551   Incision Close 1602     Anesthesia: MAC   Estimated Blood Loss: 10cc  Specimens: * No specimens in log *   Findings:    Complications: None  Implants: * No implants in log *

## 2017-09-22 NOTE — INTERDISCIPLINARY ROUNDS
Interdisciplinary team rounds were held 9/22/2017 with the following team members:Care Management, Nursing, Pharmacy, Physical Therapy and Physician. Plan of care discussed. See clinical pathway and/or care plan for interventions and desired outcomes. Anticipating discharge to home tomorrow.

## 2017-09-22 NOTE — ROUTINE PROCESS
TRANSFER - IN REPORT:    Verbal report received from GUILLERMO Foster (name) on Jose M Flurry  being received from 6th floor(unit) for ordered procedure      Report consisted of patients Situation, Background, Assessment and   Recommendations(SBAR). Information from the following report(s) SBAR, MAR and Recent Results was reviewed with the receiving nurse. Opportunity for questions and clarification was provided. Assessment completed upon patients arrival to unit and care assumed.

## 2017-09-22 NOTE — PROGRESS NOTES
Problem: Self Care Deficits Care Plan (Adult)  Goal: *Acute Goals and Plan of Care (Insert Text)      OCCUPATIONAL THERAPY: Initial Assessment and Discharge 9/22/2017  INPATIENT: Hospital Day: 5  Payor: [facility] MEDICARE / Plan: [facility] MEDICARE / Product Type: Managed Care Medicare /      NAME/AGE/GENDER: Ayden Flowers is a 55 y.o. male  PRIMARY DIAGNOSIS:  Gangrene ([facility] [address] Yasmin Coop  Gangrene ([facility] [address] Yasmin Coop <principal problem not specified> <principal problem not specified>  Procedure(s) (LRB):  LEFT 1ST TOE AMPUTATION/ CHOICE ANES / ROOM 604 (Left)  2 Days Post-Op  ICD-10: Treatment Diagnosis:        · Difficulty in walking, Not elsewhere classified (R26.2)   Precautions/Allergies:         Review of patient's allergies indicates no known allergies. ASSESSMENT:      Mr. Geno Garcia presents for the above without any other significant medical history per chart besides hypertension (smoker). States he plans to stop smoking and has nicotine patch. He is independent with all per observation. No further need for skilled OT services. This section established at most recent assessment   PROBLEM LIST (Impairments causing functional limitations):  1. Increased Pain    INTERVENTIONS PLANNED: (Benefits and precautions of occupational therapy have been discussed with the patient.)  1. Assessment only      TREATMENT PLAN: Frequency/Duration: Follow patient for today's assessment only. Rehabilitation Potential For Stated Goals: N/A      RECOMMENDED REHABILITATION/EQUIPMENT: (at time of discharge pending progress): Due to the probability of continued deficits (see above) this patient will not likely need continued skilled occupational therapy after discharge.   Equipment:   · None at this time                      OCCUPATIONAL PROFILE AND HISTORY:   History of Present Injury/Illness (Reason for Referral):  Per MD H & P: 50yo M with PMH of cigarette smoking and HTN presented to the ER with worsening pain and swelling of Left great toe. Pt was seen in ER last week for an abscess which was I&D in ER and he was discharged on Clindamycin. Pt states he has been taking meds but symptoms didn't improve so he came to ER. Reports this first started as a blister on his toe one month ago. Denies history of DM and doesn't report claudication either but states his foot becomes numb when he stands up. X Ray in ER showed gas formation in Left great Toe. He was startred on Vanc/Zosyn, hospitalist asked to admit. Pt states he feels better after pain meds. Denies any fever, chills, Rash, cough, abd pain or CP. Pain in his left great toe is constant and becomes intense on movement. Past Medical History/Comorbidities:   Mr. Toby Molina  has a past medical history of Hypertension. Mr. Toby Molina  has no past surgical history on file. Social History/Living Environment:   Home Environment: Private residence  One/Two Story Residence: One story  Living Alone: No  Support Systems: Friends \ neighbors, Family member(s)  Patient Expects to be Discharged to[de-identified] Private residence  Current DME Used/Available at Home: Crutches  Prior Level of Function/Work/Activity:  Independent.    Number of Personal Factors/Comorbidities that affect the Plan of Care: Brief history (0):  LOW COMPLEXITY   ASSESSMENT OF OCCUPATIONAL PERFORMANCE[de-identified]   Activities of Daily Living:           Basic ADLs (From Assessment) Complex ADLs (From Assessment)   Basic ADL  Feeding: Independent  Oral Facial Hygiene/Grooming: Independent  Bathing: Independent  Upper Body Dressing: Independent  Lower Body Dressing: Independent  Toileting: Independent Instrumental ADL  Meal Preparation: Independent  Homemaking: Independent  Medication Management: Independent  Financial Management: Independent   Grooming/Bathing/Dressing Activities of Daily Living     Cognitive Retraining  Safety/Judgement: Awareness of environment                       Bed/Mat Mobility  Supine to Sit: Independent  Sit to Supine: Independent  Sit to Stand: Independent  Scooting: Independent          Most Recent Physical Functioning:   Gross Assessment:  AROM: Within functional limits  Strength: Within functional limits               Posture:  Posture (WDL): Exceptions to WDL  Posture Assessment: Forward head  Balance:  Sitting: Intact  Standing: Intact Bed Mobility:  Supine to Sit: Independent  Sit to Supine: Independent  Scooting: Independent  Wheelchair Mobility:     Transfers:  Sit to Stand: Independent  Stand to Sit: Independent                    Patient Vitals for the past 6 hrs:       BP BP Patient Position SpO2 Pulse   17 0519 138/89 At rest 99 % (!) 53   17 0817 135/84 At rest 97 % (!) 57        Mental Status  Neurologic State: Alert  Orientation Level: Oriented to person, Oriented to place  Cognition: Follows commands  Perception: Appears intact  Perseveration: No perseveration noted  Safety/Judgement: Awareness of environment                               Physical Skills Involved:  1. Pain (acute) Cognitive Skills Affected (resulting in the inability to perform in a timely and safe manner):  1. None noted Psychosocial Skills Affected:  1. Habits/Routines   Number of elements that affect the Plan of Care: 1-3:  LOW COMPLEXITY   CLINICAL DECISION MAKIN[address] [place] [address] AM-PAC 6 Clicks   Daily Activity Inpatient Short Form  How much help from another person does the patient currently need. .. Total A Lot A Little None   1. Putting on and taking off regular lower body clothing?   [ ] 1   [ ] 2   [ ] 3   [X] 4   2. Bathing (including washing, rinsing, drying)? [ ] 1   [ ] 2   [ ] 3   [X] 4   3. Toileting, which includes using toilet, bedpan or urinal?   [ ] 1   [ ] 2   [ ] 3   [X] 4   4. Putting on and taking off regular upper body clothing?   [ ] 1   [ ] 2   [ ] 3   [X] 4   5. Taking care of personal grooming such as brushing teeth? [ ] 1   [ ] 2   [ ] 3   [X] 4   6. Eating meals?    [ ] 1   [ ] 2   [ ] 3 [X] 4   © 2007, Trustees of [address] [place] [address] [address] [address], under license to [employer]. All rights reserved    Score:  Initial: 24 Most Recent: X (Date: -- )     Interpretation of Tool:  Represents activities that are increasingly more difficult (i.e. Bed mobility, Transfers, Gait). Score 24 23 22-20 19-15 14-10 9-7 6       Modifier CH CI CJ CK CL CM CN         · Self Care:               - CURRENT STATUS:    CH - 0% impaired, limited or restricted               - GOAL STATUS:           CH - 0% impaired, limited or restricted               - D/C STATUS:                       CH - 0% impaired, limited or restricted  Payor: Duy Aviles 1636 / Plan: [place] [employer] [place] / Product Type: [employer] Medicare /       Medical Necessity:     · N/A. Today's assessment only. Reason for Services/Other Comments:  · N/A. Use of outcome tool(s) and clinical judgement create a POC that gives a: LOW COMPLEXITY             TREATMENT:   (In addition to Assessment/Re-Assessment sessions the following treatments were rendered)      Pre-treatment Symptoms/Complaints:    Pain: Initial:   Pain Intensity 1:  (no complaints of pain)  Post Session:  same      Assessment/Reassessment only, no treatment provided today     Braces/Orthotics/Lines/Etc:   · O2 Device: Room air  Treatment/Session Assessment:    · Response to Treatment:  No treatment rendered. Assessment only. · Interdisciplinary Collaboration:  · Occupational Therapist  · Registered Nurse  · After treatment position/precautions:  · Supine in bed  · Bed/Chair-wheels locked  · Bed in low position  · Call light within reach  · Family at bedside  · Compliance with Program/Exercises: compliant today. · Recommendations/Intent for next treatment session:  None.   Total Treatment Duration:  OT Patient Time In/Time Out  Time In: 0946  Time Out: LORENZO Manriquez, OTR/L

## 2017-09-22 NOTE — PROGRESS NOTES
Patient returned to floor oriented to room and call light system. bp 188/113 dilaudid 1 mg iv administered for pain.  Dressing c/d/i.family at bedside

## 2017-09-23 VITALS
SYSTOLIC BLOOD PRESSURE: 131 MMHG | WEIGHT: 178 LBS | DIASTOLIC BLOOD PRESSURE: 85 MMHG | OXYGEN SATURATION: 95 % | BODY MASS INDEX: 23.59 KG/M2 | HEART RATE: 58 BPM | RESPIRATION RATE: 18 BRPM | HEIGHT: 73 IN | TEMPERATURE: 98 F

## 2017-09-23 LAB
BACTERIA SPEC CULT: NORMAL
BACTERIA SPEC CULT: NORMAL
SERVICE CMNT-IMP: NORMAL
SERVICE CMNT-IMP: NORMAL

## 2017-09-23 PROCEDURE — 74011250637 HC RX REV CODE- 250/637: Performed by: INTERNAL MEDICINE

## 2017-09-23 PROCEDURE — 74011000258 HC RX REV CODE- 258: Performed by: INTERNAL MEDICINE

## 2017-09-23 PROCEDURE — 74011250636 HC RX REV CODE- 250/636: Performed by: INTERNAL MEDICINE

## 2017-09-23 PROCEDURE — 74011250636 HC RX REV CODE- 250/636: Performed by: EMERGENCY MEDICINE

## 2017-09-23 PROCEDURE — 74011250637 HC RX REV CODE- 250/637: Performed by: PHYSICIAN ASSISTANT

## 2017-09-23 RX ORDER — AMOXICILLIN AND CLAVULANATE POTASSIUM 875; 125 MG/1; MG/1
1 TABLET, FILM COATED ORAL EVERY 12 HOURS
Status: DISCONTINUED | OUTPATIENT
Start: 2017-09-23 | End: 2017-09-23 | Stop reason: HOSPADM

## 2017-09-23 RX ORDER — SULFAMETHOXAZOLE AND TRIMETHOPRIM 800; 160 MG/1; MG/1
1 TABLET ORAL EVERY 12 HOURS
Status: DISCONTINUED | OUTPATIENT
Start: 2017-09-23 | End: 2017-09-23 | Stop reason: HOSPADM

## 2017-09-23 RX ORDER — SULFAMETHOXAZOLE AND TRIMETHOPRIM 800; 160 MG/1; MG/1
1 TABLET ORAL EVERY 12 HOURS
Qty: 20 TAB | Refills: 0 | Status: SHIPPED | OUTPATIENT
Start: 2017-09-23 | End: 2017-10-03

## 2017-09-23 RX ORDER — AMOXICILLIN AND CLAVULANATE POTASSIUM 875; 125 MG/1; MG/1
1 TABLET, FILM COATED ORAL EVERY 12 HOURS
Qty: 20 TAB | Refills: 0 | Status: SHIPPED | OUTPATIENT
Start: 2017-09-23 | End: 2017-10-03

## 2017-09-23 RX ORDER — AMLODIPINE BESYLATE 5 MG/1
5 TABLET ORAL DAILY
Status: DISCONTINUED | OUTPATIENT
Start: 2017-09-23 | End: 2017-09-23 | Stop reason: HOSPADM

## 2017-09-23 RX ORDER — OXYCODONE HCL 10 MG/1
10 TABLET, FILM COATED, EXTENDED RELEASE ORAL EVERY 12 HOURS
Status: DISCONTINUED | OUTPATIENT
Start: 2017-09-23 | End: 2017-09-23 | Stop reason: HOSPADM

## 2017-09-23 RX ORDER — OXYCODONE HCL 10 MG/1
10 TABLET, FILM COATED, EXTENDED RELEASE ORAL EVERY 12 HOURS
Qty: 10 TAB | Refills: 0 | Status: SHIPPED | OUTPATIENT
Start: 2017-09-23 | End: 2017-10-12 | Stop reason: ALTCHOICE

## 2017-09-23 RX ORDER — OXYCODONE AND ACETAMINOPHEN 5; 325 MG/1; MG/1
1 TABLET ORAL
Status: DISCONTINUED | OUTPATIENT
Start: 2017-09-23 | End: 2017-09-23 | Stop reason: HOSPADM

## 2017-09-23 RX ORDER — AMLODIPINE BESYLATE 5 MG/1
5 TABLET ORAL DAILY
Qty: 30 TAB | Refills: 0 | Status: SHIPPED | OUTPATIENT
Start: 2017-09-23 | End: 2019-07-05

## 2017-09-23 RX ADMIN — HYDROCODONE BITARTRATE AND ACETAMINOPHEN 1 TABLET: 7.5; 325 TABLET ORAL at 03:00

## 2017-09-23 RX ADMIN — HYDROCODONE BITARTRATE AND ACETAMINOPHEN 1 TABLET: 7.5; 325 TABLET ORAL at 07:32

## 2017-09-23 RX ADMIN — Medication 10 ML: at 06:22

## 2017-09-23 RX ADMIN — TRAMADOL HYDROCHLORIDE 50 MG: 50 TABLET, FILM COATED ORAL at 03:00

## 2017-09-23 RX ADMIN — OXYCODONE HYDROCHLORIDE AND ACETAMINOPHEN 1 TABLET: 5; 325 TABLET ORAL at 11:27

## 2017-09-23 RX ADMIN — SULFAMETHOXAZOLE AND TRIMETHOPRIM 1 TABLET: 800; 160 TABLET ORAL at 09:29

## 2017-09-23 RX ADMIN — PIPERACILLIN SODIUM,TAZOBACTAM SODIUM 3.38 G: 3; .375 INJECTION, POWDER, FOR SOLUTION INTRAVENOUS at 04:15

## 2017-09-23 RX ADMIN — AMOXICILLIN AND CLAVULANATE POTASSIUM 1 TABLET: 875; 125 TABLET, FILM COATED ORAL at 09:30

## 2017-09-23 RX ADMIN — AMLODIPINE BESYLATE 5 MG: 5 TABLET ORAL at 09:30

## 2017-09-23 RX ADMIN — HYDROMORPHONE HYDROCHLORIDE 1 MG: 1 INJECTION, SOLUTION INTRAMUSCULAR; INTRAVENOUS; SUBCUTANEOUS at 06:47

## 2017-09-23 RX ADMIN — OXYCODONE HYDROCHLORIDE 10 MG: 10 TABLET, FILM COATED, EXTENDED RELEASE ORAL at 09:29

## 2017-09-23 RX ADMIN — HYDROMORPHONE HYDROCHLORIDE 1 MG: 1 INJECTION, SOLUTION INTRAMUSCULAR; INTRAVENOUS; SUBCUTANEOUS at 04:13

## 2017-09-23 RX ADMIN — VANCOMYCIN HYDROCHLORIDE 1250 MG: 10 INJECTION, POWDER, LYOPHILIZED, FOR SOLUTION INTRAVENOUS at 01:41

## 2017-09-23 RX ADMIN — HYDROMORPHONE HYDROCHLORIDE 1 MG: 1 INJECTION, SOLUTION INTRAMUSCULAR; INTRAVENOUS; SUBCUTANEOUS at 00:58

## 2017-09-23 NOTE — DISCHARGE INSTRUCTIONS
Call Dr. Taylor Seek for fever, chills, bleeding or other concerns  [phone]  Shower ok, no tub bath or swimming until sutures removed  Keep stitches covered, use ACE bandage for swelling  Keep leg elevated  STOP SMOKING

## 2017-09-23 NOTE — DISCHARGE SUMMARY
Hospitalist Discharge Summary     Admit Date:  2017  1:09 PM   Name:  Shaji Muller   Age:  55 y.o.  :  1960   MRN:  [id number]   PCP:  None  Treatment Team: Attending Provider: Jose Martin MD; Consulting Provider: Roderick Jones MD; Utilization Review: Ajay Modi    Problem List for this Hospitalization:  Hospital Problems as of 2017  Never Reviewed          Codes Class Noted - Resolved POA    Infection of toe ICD-10-CM: L08.9  ICD-9-CM: 686.9  2017 - Present Unknown    Overview Signed 2017 12:02 PM by Reva Lam NP     17 (Dr Helen Lopez) [address] Course:      Mr. Shon Alexis is a 54 yo male with PMH of tobacco use admitted with left great toe infection/gangrene. He was seen in the ED  s/p I and D and sent home on oral clindamycin to return with worsening complaint. Xray left foot with gas in great toe. He has compelted Day 4 vancomycin and zosyn, BC NGTD. Vascular consulted and STEVEN ordered with minimal disease. He had 17 left great toe amputation due to gangrene with plans for closure and I&D . Plans for home when stable with augmentin and bactrim for 10 additional days and home health wound care. He will see vascular in followup. Follow up instructions below. Plan was discussed with patient. All questions answered. Patient was stable at time of discharge and was instructed to call or return if there are any concerns or recurrence of symptoms. Diagnostic Imaging/Tests:   Xr Great Toe Lt Min 2 V    Result Date: 2017   XR GREAT TOE LT MIN 2 V    2017 3:32 PM CLINICAL INFORMATION:  Left great toe infection with possible tissue necrosis. Tenderness. Comparison: 2017. Findings: 3 views left toes. There are multiple small soft tissue gas bubbles in the great toe at the level of the distal phalanx. No retained radiopaque foreign body. No acute fracture.  No overt erosive nor osseous destructive process. IMPRESSION: 1. Numerous small soft tissue gas bubbles are present at the level of the distal phalanx. Duplex Low Ext Arteries With Janine    Result Date: 9/19/2017  Lower extremity arterial duplex, 9/19/2017. INDICATION: Left great toe gangrene. COMPARISON: None. FINDINGS: The resting ankle-brachial index on the right is 1.15 and on the left 0.93. The digital pressure ratio on the right is 0.69 and on the left it is 0. This is at the great toe. Digital waveforms show relatively normal waveform on the right and a very flattened waveform on the left great toe. The duplex of the right lower extremity shows triphasic waveform at the common femoral artery. The profunda femoris artery is patent. SFA appears to be within normal limits. The peroneal artery and PTA are patent. The FLAKO is somewhat diminished flow. The left common femoral artery is patent the profunda femoris artery is also patent. The SFA has biphasic waveform flow. Popliteal artery the peroneal artery and PTA and FLAKO are patent. However there is more spectral broadening of the vessels below the knee. IMPRESSION: 1. Right lower extremity: Diminished flow in the distal right anterior tibial artery. No other significant stenoses noted. 2. 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 More monophasic waveforms and popliteal artery distally suggest some diffuse but not flow limiting disease. Echocardiogram results:  No results found for this visit on 09/18/17.       All Micro Results     Procedure Component Value Units Date/Time    CULTURE, BLOOD [[id number]] Collected:  09/18/17 1518    Order Status:  Completed Specimen:  Blood from Blood Updated:  09/23/17 0911     Special Requests: --        RIGHT  FOREARM       Culture result: NO GROWTH 5 DAYS       CULTURE, BLOOD [[id number]] Collected:  09/18/17 1510    Order Status:  Completed Specimen:  Blood from Blood Updated:  09/23/17 0911     Special Requests: --        LEFT  Antecubital       Culture result: NO GROWTH 5 DAYS             Labs: Results:       BMP, Mg, Phos Recent Labs      09/21/17   0930  09/20/17   1608   NA  140   --    K  4.1   --    CL  103   --    CO2  33*   --    AGAP  4*   --    BUN  6   --    CREA  0.82  0.88   CA  9.3   --    GLU  83   --       CBC Recent Labs      09/21/17   0930   WBC  5.5   RBC  4.81   HGB  14.8   HCT  43.6   PLT  327   GRANS  67   LYMPH  20   EOS  2   MONOS  11   BASOS  0   IG  0.2   ANEU  3.6   ABL  1.1   KOBE  0.1   ABM  0.6   ABB  0.0   AIG  0.0      LFT No results for input(s): SGOT, ALT, TBIL, AP, TP, ALB, GLOB, AGRAT, GPT in the last 72 hours.    Cardiac Testing No results found for: BNPP, BNP, CPK, RCK1, RCK2, RCK3, RCK4, CKMB, CKNDX, CKND1, TROPT, TROIQ   Coagulation Tests No results found for: PTP, INR, APTT   A1c No results found for: HBA1C, HGBE8, VKE3EPNZ   Lipid Panel No results found for: CHOL, CHOLPOCT, CHOLX, CHLST, CHOLV, 720819, HDL, LDL, LDLC, DLDLP, 599640, VLDLC, VLDL, TGLX, TRIGL, TRIGP, TGLPOCT, CHHD, CHHDX   Thyroid Panel No results found for: T4, T3U, TSH, TSHEXT     Most Recent UA No results found for: COLOR, APPRN, REFSG, BALWINDER, PROTU, GLUCU, KETU, BILU, BLDU, UROU, TONA, LEUKU     No Known Allergies  Immunization History   Administered Date(s) Administered    TB Skin Test (PPD) Intradermal 09/18/2017       All Labs from Last 24 Hrs:  Recent Results (from the past 24 hour(s))   PLEASE READ & DOCUMENT PPD TEST IN 24 HRS    Collection Time: 09/22/17  5:51 PM   Result Value Ref Range    PPD  Negative    mm Induration  0 mm       Discharge Exam:  Patient Vitals for the past 24 hrs:   Temp Pulse Resp BP SpO2   09/23/17 1128 98 °F (36.7 °C) (!) 58 18 131/85 95 %   09/23/17 0751 97.6 °F (36.4 °C) (!) 55 18 (!) 146/94 96 %   09/23/17 0335 98.2 °F (36.8 °C) 63 18 137/86 98 %   09/22/17 2340 97.7 °F (36.5 °C) 60 16 150/90 99 %   09/22/17 1940 98.1 °F (36.7 °C) 66 18 129/81 97 %   09/22/17 1752 97.6 °F (36.4 °C) 60 18 (!) 188/113 95 %   09/22/17 1654 - 61 16 - 99 %   09/22/17 1645 98 °F (36.7 °C) (!) 49 16 142/90 100 %   09/22/17 1640 - (!) 52 16 142/90 100 %   09/22/17 1635 - (!) 48 16 141/88 100 %   09/22/17 1629 - (!) 49 16 (!) 141/94 98 %   09/22/17 1625 - (!) 54 16 (!) 129/93 100 %   09/22/17 1620 97.9 °F (36.6 °C) (!) 52 16 134/83 100 %   09/22/17 1520 - - - 146/89 100 %   09/22/17 1515 - - - 141/82 100 %   09/22/17 1510 - - - (!) 147/93 100 %   09/22/17 1505 - - - 151/89 100 %   09/22/17 1500 - - - 149/86 100 %   09/22/17 1454 - - - 143/82 100 %   09/22/17 1449 - (!) 49 20 140/89 100 %   09/22/17 1444 - - - (!) 146/95 100 %   09/22/17 1439 - (!) 48 10 134/84 100 %   09/22/17 1434 - (!) 49 15 131/84 100 %   09/22/17 1429 - (!) 49 15 130/85 100 %   09/22/17 1424 - - - (!) 143/99 100 %   09/22/17 1414 - (!) 52 15 (!) 145/94 100 %   09/22/17 1409 - - - (!) 154/98 100 %   09/22/17 1404 - (!) 54 14 (!) 162/102 100 %   09/22/17 1358 - (!) 52 12 (!) 183/112 100 %   09/22/17 1336 97.9 °F (36.6 °C) (!) 48 18 (!) 153/100 100 %     Oxygen Therapy  O2 Sat (%): 95 % (09/23/17 1128)  Pulse via Oximetry: 55 beats per minute (09/22/17 1654)  O2 Device: Room air (09/22/17 1645)  O2 Flow Rate (L/min): 3 l/min (09/22/17 1620)    Intake/Output Summary (Last 24 hours) at 09/23/17 1308  Last data filed at 09/23/17 0751   Gross per 24 hour   Intake              915 ml   Output             1175 ml   Net             -260 ml           General:                    Well nourished. Alert. CV:                                      RRR. No murmur, rub, or gallop. Lungs:                       Clear to auscultation bilaterally. No wheezing, rhonchi, or rales. Abdomen:                  Soft, nontender, nondistended. Skin:                                    No rashes or jaundice.  Left foot bandaged            Discharge Info:   Current Discharge Medication List      START taking these medications    Details   amLODIPine (NORVASC) 5 mg tablet Take 1 Tab by mouth daily. Qty: 30 Tab, Refills: 0      amoxicillin-clavulanate (AUGMENTIN) 875-125 mg per tablet Take 1 Tab by mouth every twelve (12) hours for 10 days. Qty: 20 Tab, Refills: 0      oxyCODONE ER (OXYCONTIN) 10 mg ER tablet Take 1 Tab by mouth every twelve (12) hours. Max Daily Amount: 20 mg.  Qty: 10 Tab, Refills: 0      trimethoprim-sulfamethoxazole (BACTRIM DS, SEPTRA DS) 160-800 mg per tablet Take 1 Tab by mouth every twelve (12) hours for 10 days. Qty: 20 Tab, Refills: 0         STOP taking these medications       diclofenac EC (VOLTAREN) 50 mg EC tablet Comments:   Reason for Stopping:                 Disposition: home    Activity: Activity as tolerated  Diet: DIET CARDIAC Regular    Follow-up Appointments   Procedures    FOLLOW UP VISIT Appointment in: One Week 1 week vascular dr Isidro Pascual, home health wound care     1 week vascular dr Isidro Pascual, home health wound care     Standing Status:   Standing     Number of Occurrences:   1     Order Specific Question:   Appointment in     Answer: One Week         Follow-up Information     Follow up With Details Comments Contact Info    None   None ([phone]) Patient stated that they have no PCP            Time spent in patient discharge planning and coordination 30 minutes.     Signed:  Ruthy Darnell MD

## 2017-09-23 NOTE — PROGRESS NOTES
Pt reports that his pain is not under control. Dilaudid seems to help for about an hour and then pain returns. norco 7.5mg  and tramadol 50 mg is reported to not help much. Pt would like to know other options to help pain.  Pt wants to speak to hospitalist.

## 2017-09-23 NOTE — PROGRESS NOTES
Problem: Falls - Risk of  Goal: *Absence of Falls  Document Chana Fall Risk and appropriate interventions in the flowsheet.    Outcome: Progressing Towards Goal  Fall Risk Interventions:  Mobility Interventions: Patient to call before getting OOB           Medication Interventions: Patient to call before getting OOB, Teach patient to arise slowly     Elimination Interventions: Call light in reach, Patient to call for help with toileting needs, Toileting schedule/hourly rounds

## 2017-09-23 NOTE — PROGRESS NOTES
[address] FAX: [phone]      Vascular Surgery Progress Note    Admit Date: 2017  POD 1 Day Post-Op    Procedure:  Procedure(s):  LEFT FIRST TOE AMPUTATION REVISION AND CLOSURE       Subjective:     Patient with post op L gr toe pain as expected. Objective:     Blood pressure 131/85, pulse (!) 58, temperature 98 °F (36.7 °C), resp. rate 18, height 6' 1\" (1.854 m), weight 178 lb (80.7 kg), SpO2 95 %. Temp (24hrs), Av.9 °F (36.6 °C), Min:97.6 °F (36.4 °C), Max:98.2 °F (36.8 °C)      Physical Exam:  GENERAL: alert, cooperative, no distress, LUNG:  clear to auscultation bilaterally, HEART:  regular rate and rhythm, ABDOMEN:  soft, flat and INCISION:  L foot surgical dressing removed. Interrupted sutures CDI, no active bleeding, mild ecchymosis, mild edema. No new lesions.      Labs:   Recent Labs      17   0930   HGB  14.8   WBC  5.5   K  4.1   GLU  83       Data Review: images and reports reviewed  Current Facility-Administered Medications   Medication Dose Route Frequency    amoxicillin-clavulanate (AUGMENTIN) 875-125 mg per tablet 1 Tab  1 Tab Oral Q12H    trimethoprim-sulfamethoxazole (BACTRIM DS, SEPTRA DS) 160-800 mg per tablet 1 Tab  1 Tab Oral Q12H    amLODIPine (NORVASC) tablet 5 mg  5 mg Oral DAILY    oxyCODONE ER (OxyCONTIN) tablet 10 mg  10 mg Oral Q12H    oxyCODONE-acetaminophen (PERCOCET) 5-325 mg per tablet 1 Tab  1 Tab Oral Q6H PRN    nicotine (NICODERM CQ) 7 mg/24 hr patch 1 Patch  1 Patch TransDERmal DAILY    enalaprilat (VASOTEC) injection 0.625 mg  0.625 mg IntraVENous Q6H PRN    sodium chloride (NS) flush 5-10 mL  5-10 mL IntraVENous Q8H    sodium chloride (NS) flush 5-10 mL  5-10 mL IntraVENous PRN    acetaminophen (TYLENOL) tablet 650 mg  650 mg Oral Q4H PRN    naloxone (NARCAN) injection 0.4 mg  0.4 mg IntraVENous PRN    diphenhydrAMINE (BENADRYL) injection 12.5 mg  12.5 mg IntraVENous Q4H PRN    ondansetron (ZOFRAN) injection 4 mg  4 mg IntraVENous Q4H PRN    bisacodyl (DULCOLAX) tablet 5 mg  5 mg Oral DAILY PRN    enoxaparin (LOVENOX) injection 40 mg  40 mg SubCUTAneous Q24H     Assessment:     Principal Problem:    Infection of toe (9/18/2017)      Overview: 9/22/17 (Dr Timi Aguirre) LEFT FIRST TOE AMPUTATION REVISION AND CLOSURE        Plan/Recommendations/Medical Decision Making:     Patient may be dc from Vascular Surgery standpoint. Dressing care: OK to shower, keep sutures covered with gauze or band aid, keep ace on. Elevate LLE whenever possible. Follow up with Dr Timi Aguirre in 2 weeks. All above discussed with patient. SMOKING CESSATION 100% discussed. Signed By: Taylor Membreno NP     September 23, 2017 9/24/2017     11:14 AM    I have personally seen and examined Jacob Rosales with  BRETT Morales. I agree and confirm findings in history, physical exam, and assessment/plan as outlined above, except as noted below.     Delicia Alvarez MD

## 2017-09-25 NOTE — OP NOTES
[address]   OPERATIVE REPORT       Name:  Kamryn White   MR#:  [id number]   :  1960   Account #:  [de-identified]   Date of Adm:  2017       DATE OF SURGERY: 2017    PREOPERATIVE DIAGNOSIS: Status post open left first toe   amputation. POSTOPERATIVE DIAGNOSIS: Status post open left first toe   amputation. PROCEDURES: Revision and closure of left first toe amputation. SURGEON: Sathya Nair MD    ANESTHESIA: Regional block. ESTIMATED BLOOD LOSS: Minimal.    DRAINS: None. SPECIMENS: None. COMPLICATIONS: None. DESCRIPTION OF PROCEDURE: The patient was brought to the   operating room, placed on the operating table in supine   position. Following adequate regional block, the left foot was   draped and prepped in sterile fashion. Next, the wound was   inspected and a small amount of necrotic tissue remained around   the margins of the wound. This was excised sharply. The residual   proximal first phalanx was resected with a rongeur. This was   continued back to the articular surface of the first metatarsal   head and this was resected as well. The wound was then copiously   irrigated and closed in layers of Vicryl suture, followed by   skin closures of nylon suture. Sterile dry dressings were   applied. The patient was awakened from anesthesia and   transferred to the recovery room in stable condition. The   patient tolerated the procedure well. No complications. All   needle and sponge counts were correct.         MD Alfred Cao / Jarod Salas   D:  2017   13:47   T:  2017   14:09   Job #:  [id number]

## 2018-04-21 ENCOUNTER — HOSPITAL ENCOUNTER (EMERGENCY)
Age: 58
Discharge: HOME | End: 2018-04-21
Attending: EMERGENCY MEDICINE
Payer: MEDICARE

## 2018-04-21 ENCOUNTER — APPOINTMENT (OUTPATIENT)
Dept: GENERAL RADIOLOGY | Age: 58
End: 2018-04-21
Attending: NURSE PRACTITIONER
Payer: MEDICARE

## 2018-04-21 VITALS
HEIGHT: 73 IN | OXYGEN SATURATION: 97 % | DIASTOLIC BLOOD PRESSURE: 87 MMHG | BODY MASS INDEX: 21.87 KG/M2 | HEART RATE: 81 BPM | SYSTOLIC BLOOD PRESSURE: 146 MMHG | RESPIRATION RATE: 16 BRPM | TEMPERATURE: 98.4 F | WEIGHT: 165 LBS

## 2018-04-21 DIAGNOSIS — S62.609A CLOSED NONDISPLACED FRACTURE OF PHALANX OF FINGER, UNSPECIFIED FINGER, UNSPECIFIED PHALANX, INITIAL ENCOUNTER: Primary | ICD-10-CM

## 2018-04-21 DIAGNOSIS — T14.8XXA ABRASION: ICD-10-CM

## 2018-04-21 LAB
ANION GAP SERPL CALC-SCNC: 6 MMOL/L (ref 7–16)
BASOPHILS # BLD: 0 K/UL (ref 0–0.2)
BASOPHILS NFR BLD: 0 % (ref 0–2)
BUN SERPL-MCNC: 13 MG/DL (ref 6–23)
CALCIUM SERPL-MCNC: 8.9 MG/DL (ref 8.3–10.4)
CHLORIDE SERPL-SCNC: 106 MMOL/L (ref 98–107)
CO2 SERPL-SCNC: 32 MMOL/L (ref 21–32)
CREAT SERPL-MCNC: 1.14 MG/DL (ref 0.8–1.5)
DIFFERENTIAL METHOD BLD: ABNORMAL
EOSINOPHIL # BLD: 0.1 K/UL (ref 0–0.8)
EOSINOPHIL NFR BLD: 1 % (ref 0.5–7.8)
ERYTHROCYTE [DISTWIDTH] IN BLOOD BY AUTOMATED COUNT: 13.9 % (ref 11.9–14.6)
GLUCOSE SERPL-MCNC: 126 MG/DL (ref 65–100)
HCT VFR BLD AUTO: 50.8 % (ref 41.1–50.3)
HGB BLD-MCNC: 17.8 G/DL (ref 13.6–17.2)
IMM GRANULOCYTES # BLD: 0 K/UL (ref 0–0.5)
IMM GRANULOCYTES NFR BLD AUTO: 0 % (ref 0–5)
LYMPHOCYTES # BLD: 1.1 K/UL (ref 0.5–4.6)
LYMPHOCYTES NFR BLD: 16 % (ref 13–44)
MCH RBC QN AUTO: 32.2 PG (ref 26.1–32.9)
MCHC RBC AUTO-ENTMCNC: 35 G/DL (ref 31.4–35)
MCV RBC AUTO: 91.9 FL (ref 79.6–97.8)
MONOCYTES # BLD: 0.9 K/UL (ref 0.1–1.3)
MONOCYTES NFR BLD: 13 % (ref 4–12)
NEUTS SEG # BLD: 4.8 K/UL (ref 1.7–8.2)
NEUTS SEG NFR BLD: 70 % (ref 43–78)
PLATELET # BLD AUTO: 223 K/UL (ref 150–450)
PMV BLD AUTO: 9.9 FL (ref 10.8–14.1)
POTASSIUM SERPL-SCNC: 3.8 MMOL/L (ref 3.5–5.1)
RBC # BLD AUTO: 5.53 M/UL (ref 4.23–5.67)
SODIUM SERPL-SCNC: 144 MMOL/L (ref 136–145)
WBC # BLD AUTO: 6.8 K/UL (ref 4.3–11.1)

## 2018-04-21 PROCEDURE — 99283 EMERGENCY DEPT VISIT LOW MDM: CPT | Performed by: NURSE PRACTITIONER

## 2018-04-21 PROCEDURE — 96365 THER/PROPH/DIAG IV INF INIT: CPT | Performed by: NURSE PRACTITIONER

## 2018-04-21 PROCEDURE — 74011000258 HC RX REV CODE- 258: Performed by: NURSE PRACTITIONER

## 2018-04-21 PROCEDURE — 85025 COMPLETE CBC W/AUTO DIFF WBC: CPT | Performed by: NURSE PRACTITIONER

## 2018-04-21 PROCEDURE — 80048 BASIC METABOLIC PNL TOTAL CA: CPT | Performed by: NURSE PRACTITIONER

## 2018-04-21 PROCEDURE — 90715 TDAP VACCINE 7 YRS/> IM: CPT | Performed by: NURSE PRACTITIONER

## 2018-04-21 PROCEDURE — 74011250636 HC RX REV CODE- 250/636: Performed by: NURSE PRACTITIONER

## 2018-04-21 PROCEDURE — 73130 X-RAY EXAM OF HAND: CPT

## 2018-04-21 PROCEDURE — 90471 IMMUNIZATION ADMIN: CPT | Performed by: NURSE PRACTITIONER

## 2018-04-21 RX ORDER — TRAMADOL HYDROCHLORIDE 50 MG/1
50 TABLET ORAL
Qty: 15 TAB | Refills: 0 | Status: SHIPPED | OUTPATIENT
Start: 2018-04-21

## 2018-04-21 RX ORDER — CEPHALEXIN 500 MG/1
500 CAPSULE ORAL 4 TIMES DAILY
Qty: 28 CAP | Refills: 0 | Status: SHIPPED | OUTPATIENT
Start: 2018-04-21 | End: 2018-04-28

## 2018-04-21 RX ADMIN — TETANUS TOXOID, REDUCED DIPHTHERIA TOXOID AND ACELLULAR PERTUSSIS VACCINE, ADSORBED 0.5 ML: 5; 2.5; 8; 8; 2.5 SUSPENSION INTRAMUSCULAR at 14:52

## 2018-04-21 RX ADMIN — PIPERACILLIN SODIUM,TAZOBACTAM SODIUM 4.5 G: 4; .5 INJECTION, POWDER, FOR SOLUTION INTRAVENOUS at 16:06

## 2018-04-21 NOTE — ED NOTES
I have reviewed discharge instructions with the patient. The patient verbalized understanding. Patient left ED via Discharge Method: ambulatory to Home with family. Opportunity for questions and clarification provided. Patient given 2 scripts. To continue your aftercare when you leave the hospital, you may receive an automated call from our care team to check in on how you are doing. This is a free service and part of our promise to provide the best care and service to meet your aftercare needs.  If you have questions, or wish to unsubscribe from this service please call [phone]. Thank you for Choosing our [facility] Emergency Department.

## 2018-04-21 NOTE — ED PROVIDER NOTES
HPI Comments: Patient states yesterday he was at work when the wheel barrel he was working with [employer] away\" with him. He states wheel barrel drug his hand down concrete. He has multiple abrasions to his left hand. He states pain with movement of his 4th finger on his left hand. He states pain increases with movement and with palpation. The history is provided by the patient. Past Medical History:   Diagnosis Date    Hypertension        Past Surgical History:   Procedure Laterality Date    VASCULAR SURGERY PROCEDURE UNLIST Left 09/20/2017    open great toe amputation    VASCULAR SURGERY PROCEDURE UNLIST Left 09/22/2017    revision&closure of great toe amp. History reviewed. No pertinent family history. Social History     Social History    Marital status: SINGLE     Spouse name: N/A    Number of children: N/A    Years of education: N/A     Occupational History    Not on file. Social History Main Topics    Smoking status: Former Smoker     Quit date: 10/9/2017    Smokeless tobacco: Never Used    Alcohol use Yes    Drug use: Yes     Special: Marijuana    Sexual activity: Not on file     Other Topics Concern    Not on file     Social History Narrative         ALLERGIES: Review of patient's allergies indicates no known allergies. Review of Systems   Constitutional: Negative for chills and fever. Respiratory: Negative for cough and shortness of breath. Cardiovascular: Negative for chest pain. Gastrointestinal: Negative for abdominal pain, constipation, diarrhea, nausea and vomiting. Musculoskeletal: Positive for arthralgias and joint swelling. Skin: Positive for wound. Neurological: Negative for dizziness. Vitals:    04/21/18 1423   BP: 146/87   Pulse: 81   Resp: 16   Temp: 98.4 °F (36.9 °C)   SpO2: 97%   Weight: 74.8 kg (165 lb)   Height: 6' 1\" (1.854 m)            Physical Exam   Constitutional: He is oriented to person, place, and time.  He appears well-developed and well-nourished. No distress. Cardiovascular: Normal rate and regular rhythm. No murmur heard. Pulmonary/Chest: Effort normal and breath sounds normal. No respiratory distress. Musculoskeletal:        Left hand: He exhibits decreased range of motion, bony tenderness and swelling. He exhibits normal capillary refill. Normal sensation noted. Normal strength noted. Hands:  Neurological: He is alert and oriented to person, place, and time. Skin: Skin is warm. He is not diaphoretic. Psychiatric: He has a normal mood and affect. His behavior is normal.   Nursing note and vitals reviewed. Recent Results (from the past 12 hour(s))   CBC WITH AUTOMATED DIFF    Collection Time: 04/21/18  3:54 PM   Result Value Ref Range    WBC 6.8 4.3 - 11.1 K/uL    RBC 5.53 4.23 - 5.67 M/uL    HGB 17.8 (H) 13.6 - 17.2 g/dL    HCT 50.8 (H) 41.1 - 50.3 %    MCV 91.9 79.6 - 97.8 FL    MCH 32.2 26.1 - 32.9 PG    MCHC 35.0 31.4 - 35.0 g/dL    RDW 13.9 11.9 - 14.6 %    PLATELET 009 450 - 975 K/uL    MPV 9.9 (L) 10.8 - 14.1 FL    DF AUTOMATED      NEUTROPHILS 70 43 - 78 %    LYMPHOCYTES 16 13 - 44 %    MONOCYTES 13 (H) 4.0 - 12.0 %    EOSINOPHILS 1 0.5 - 7.8 %    BASOPHILS 0 0.0 - 2.0 %    IMMATURE GRANULOCYTES 0 0.0 - 5.0 %    ABS. NEUTROPHILS 4.8 1.7 - 8.2 K/UL    ABS. LYMPHOCYTES 1.1 0.5 - 4.6 K/UL    ABS. MONOCYTES 0.9 0.1 - 1.3 K/UL    ABS. EOSINOPHILS 0.1 0.0 - 0.8 K/UL    ABS. BASOPHILS 0.0 0.0 - 0.2 K/UL    ABS. IMM.  GRANS. 0.0 0.0 - 0.5 K/UL   METABOLIC PANEL, BASIC    Collection Time: 04/21/18  3:54 PM   Result Value Ref Range    Sodium 144 136 - 145 mmol/L    Potassium 3.8 3.5 - 5.1 mmol/L    Chloride 106 98 - 107 mmol/L    CO2 32 21 - 32 mmol/L    Anion gap 6 (L) 7 - 16 mmol/L    Glucose 126 (H) 65 - 100 mg/dL    BUN 13 6 - 23 MG/DL    Creatinine 1.14 0.8 - 1.5 MG/DL    GFR est AA >60 >60 ml/min/1.73m2    GFR est non-AA >60 >60 ml/min/1.73m2    Calcium 8.9 8.3 - 10.4 MG/DL     Xr Hand Lt Min 3 V    Result Date: 4/21/2018  Left hand series HISTORY: Pain after trauma 3 views of the left hand were obtained. No prior studies are available for comparison. FINDINGS: There is an acute fracture involving the fourth proximal phalanx at the proximal base. There is very mild medial displacement of the distal fracture fragment. There is associated soft tissue swelling. There is no bony destruction. IMPRESSION: Fracture of the fourth proximal phalanx. MDM  Number of Diagnoses or Management Options  Abrasion:   Closed nondisplaced fracture of phalanx of finger, unspecified finger, unspecified phalanx, initial encounter:   Diagnosis management comments: Xray positive for fracture to his 4th phalanx. No acute findings noted on lab results. Fingers kyree taped. Patient given  IV zosyn and wound care provided prior to discharge. Patient given prescriptions for tramadol and keflex. Patient will follow up with orthopedics per Dr. Villa .         Amount and/or Complexity of Data Reviewed  Clinical lab tests: reviewed and ordered  Tests in the radiology section of CPT®: reviewed and ordered  Tests in the medicine section of CPT®: ordered and reviewed  Discuss the patient with other providers: yes Cristóbal Mariscal  )    Patient Progress  Patient progress: stable        ED Course       Procedures

## 2018-04-21 NOTE — DISCHARGE INSTRUCTIONS
Finger Fracture: Care Instructions  Your Care Instructions    Breaks in the bones of the finger usually heal well in about 3 to 4 weeks. The pain and swelling from a broken finger can last for weeks. But it should steadily improve, starting a few days after you break it. It is very important that you wear and take care of the cast or splint exactly as your doctor tells you to so that your finger heals properly and does not end up crooked. Wearing a splint may interfere with your normal activities. Ask for help with daily tasks if you need it. You heal best when you take good care of yourself. Eat a variety of healthy foods, and don't smoke. Follow-up care is a key part of your treatment and safety. Be sure to make and go to all appointments, and call your doctor if you are having problems. It's also a good idea to know your test results and keep a list of the medicines you take. How can you care for yourself at home? · If your doctor put a splint on your finger, wear the splint exactly as directed. Do not remove it until your doctor says that you can. · Keep your hand raised above the level of your heart as much as you can. This will help reduce swelling. · Put ice or a cold pack on your finger for 10 to 20 minutes at a time. Try to do this every 1 to 2 hours for the next 3 days (when you are awake) or until the swelling goes down. Put a thin cloth between the ice and your skin. Keep the splint dry. · Be safe with medicines. Take pain medicines exactly as directed. ¨ If the doctor gave you a prescription medicine for pain, take it as prescribed. ¨ If you are not taking a prescription pain medicine, ask your doctor if you can take an over-the-counter medicine. When should you call for help? Call 911 anytime you think you may need emergency care. For example, call if:  ? · Your finger is cool or pale or changes color.    ?Call your doctor now or seek immediate medical care if:  ? · Your pain gets much worse.   ? · You have tingling, weakness, or numbness in your finger. ? · You have signs of infection, such as:  ¨ Increased pain, swelling, warmth, or redness. ¨ Red streaks leading from the area. ¨ Pus draining from the area. ¨ Swollen lymph nodes in your neck, armpits, or groin. ¨ A fever. ? Watch closely for changes in your health, and be sure to contact your doctor if:  ? · Your finger is not steadily improving. Where can you learn more? Go to http://edwige-avi.info/. Enter E321 in the search box to learn more about \"Finger Fracture: Care Instructions. \"  Current as of: March 21, 2017  Content Version: 11.4  © 6844-7371 [employer]. Care instructions adapted under license by [employer] (which disclaims liability or warranty for this information). If you have questions about a medical condition or this instruction, always ask your healthcare professional. [place] [address] any warranty or liability for your use of this information.

## 2018-04-21 NOTE — ED NOTES
Wet to dry dressing with sterile water before discharge. Pt educated on proper technique to change dressing at home.

## 2018-04-21 NOTE — ED TRIAGE NOTES
Working with wheelbarrow, scraped left hand knuckles when wheelbarrow got away from him. No alteration in ROM of left hand, but left knuckles are scraped and open wounds are present. No bleeding at this time. Wounds are clean.

## 2019-07-05 ENCOUNTER — HOSPITAL ENCOUNTER (EMERGENCY)
Age: 59
Discharge: HOME | End: 2019-07-05
Attending: EMERGENCY MEDICINE
Payer: MEDICARE

## 2019-07-05 VITALS
WEIGHT: 158 LBS | OXYGEN SATURATION: 100 % | SYSTOLIC BLOOD PRESSURE: 160 MMHG | DIASTOLIC BLOOD PRESSURE: 103 MMHG | BODY MASS INDEX: 21.4 KG/M2 | TEMPERATURE: 98.1 F | HEIGHT: 72 IN | RESPIRATION RATE: 16 BRPM | HEART RATE: 53 BPM

## 2019-07-05 DIAGNOSIS — I10 UNCONTROLLED HYPERTENSION: Primary | ICD-10-CM

## 2019-07-05 LAB
ALBUMIN SERPL-MCNC: 3.5 G/DL (ref 3.5–5)
ALBUMIN/GLOB SERPL: 0.8 {RATIO} (ref 1.2–3.5)
ALP SERPL-CCNC: 113 U/L (ref 50–136)
ALT SERPL-CCNC: 14 U/L (ref 12–65)
ANION GAP SERPL CALC-SCNC: 6 MMOL/L (ref 7–16)
AST SERPL-CCNC: 12 U/L (ref 15–37)
BASOPHILS # BLD: 0 K/UL (ref 0–0.2)
BASOPHILS NFR BLD: 1 % (ref 0–2)
BILIRUB SERPL-MCNC: 0.6 MG/DL (ref 0.2–1.1)
BUN SERPL-MCNC: 9 MG/DL (ref 6–23)
CALCIUM SERPL-MCNC: 9.6 MG/DL (ref 8.3–10.4)
CHLORIDE SERPL-SCNC: 106 MMOL/L (ref 98–107)
CO2 SERPL-SCNC: 30 MMOL/L (ref 21–32)
CREAT SERPL-MCNC: 0.95 MG/DL (ref 0.8–1.5)
DIFFERENTIAL METHOD BLD: ABNORMAL
EOSINOPHIL # BLD: 0.1 K/UL (ref 0–0.8)
EOSINOPHIL NFR BLD: 2 % (ref 0.5–7.8)
ERYTHROCYTE [DISTWIDTH] IN BLOOD BY AUTOMATED COUNT: 13.5 % (ref 11.9–14.6)
GLOBULIN SER CALC-MCNC: 4.2 G/DL (ref 2.3–3.5)
GLUCOSE SERPL-MCNC: 101 MG/DL (ref 65–100)
HCT VFR BLD AUTO: 52.5 % (ref 41.1–50.3)
HGB BLD-MCNC: 18 G/DL (ref 13.6–17.2)
IMM GRANULOCYTES # BLD AUTO: 0 K/UL (ref 0–0.5)
IMM GRANULOCYTES NFR BLD AUTO: 0 % (ref 0–5)
LYMPHOCYTES # BLD: 1.2 K/UL (ref 0.5–4.6)
LYMPHOCYTES NFR BLD: 24 % (ref 13–44)
MCH RBC QN AUTO: 31.3 PG (ref 26.1–32.9)
MCHC RBC AUTO-ENTMCNC: 34.3 G/DL (ref 31.4–35)
MCV RBC AUTO: 91.3 FL (ref 79.6–97.8)
MONOCYTES # BLD: 0.5 K/UL (ref 0.1–1.3)
MONOCYTES NFR BLD: 9 % (ref 4–12)
NEUTS SEG # BLD: 3.2 K/UL (ref 1.7–8.2)
NEUTS SEG NFR BLD: 64 % (ref 43–78)
NRBC # BLD: 0 K/UL (ref 0–0.2)
PLATELET # BLD AUTO: 206 K/UL (ref 150–450)
PMV BLD AUTO: 9.6 FL (ref 9.4–12.3)
POTASSIUM SERPL-SCNC: 3.5 MMOL/L (ref 3.5–5.1)
PROT SERPL-MCNC: 7.7 G/DL (ref 6.3–8.2)
RBC # BLD AUTO: 5.75 M/UL (ref 4.23–5.6)
SODIUM SERPL-SCNC: 142 MMOL/L (ref 136–145)
WBC # BLD AUTO: 5 K/UL (ref 4.3–11.1)

## 2019-07-05 PROCEDURE — 74011250637 HC RX REV CODE- 250/637: Performed by: EMERGENCY MEDICINE

## 2019-07-05 PROCEDURE — 80053 COMPREHEN METABOLIC PANEL: CPT

## 2019-07-05 PROCEDURE — 99285 EMERGENCY DEPT VISIT HI MDM: CPT | Performed by: EMERGENCY MEDICINE

## 2019-07-05 PROCEDURE — 85025 COMPLETE CBC W/AUTO DIFF WBC: CPT

## 2019-07-05 PROCEDURE — 93005 ELECTROCARDIOGRAM TRACING: CPT | Performed by: EMERGENCY MEDICINE

## 2019-07-05 RX ORDER — AMLODIPINE BESYLATE 5 MG/1
5 TABLET ORAL DAILY
Qty: 30 TAB | Refills: 3 | Status: SHIPPED | OUTPATIENT
Start: 2019-07-05

## 2019-07-05 RX ORDER — AMLODIPINE BESYLATE 10 MG/1
10 TABLET ORAL
Status: COMPLETED | OUTPATIENT
Start: 2019-07-05 | End: 2019-07-05

## 2019-07-05 RX ADMIN — AMLODIPINE BESYLATE 10 MG: 10 TABLET ORAL at 21:40

## 2019-07-05 NOTE — ED TRIAGE NOTES
Pt coming in by [facility] for high blood pressure. patient states he woke up this morning with dizziness. Patient denies any change in vision. Cinicinatti negative. Patient stated having numbness in his left forearm when he woke up with morning. States the numbness comes and goes. Hx of HTN. Supposed to be taking amlodipine but has not been taking it in months. Denies headache or slurred speech. Denies chest px or SHOB. Denies nausea or vomiting.  Denies blood thinners

## 2019-07-05 NOTE — ED TRIAGE NOTES
Patient states his blood pressure was 170/118 at home. States he used to take amlodipine but has been out for almost two years. Denies chest pain. States he has seen some little dots. Reports dizziness.

## 2019-07-06 LAB
ATRIAL RATE: 64 BPM
CALCULATED P AXIS, ECG09: 73 DEGREES
CALCULATED R AXIS, ECG10: 21 DEGREES
CALCULATED T AXIS, ECG11: 16 DEGREES
DIAGNOSIS, 93000: NORMAL
P-R INTERVAL, ECG05: 146 MS
Q-T INTERVAL, ECG07: 448 MS
QRS DURATION, ECG06: 88 MS
QTC CALCULATION (BEZET), ECG08: 462 MS
VENTRICULAR RATE, ECG03: 64 BPM

## 2019-07-06 NOTE — ED NOTES
I have reviewed discharge instructions with the patient. The patient verbalized understanding. Patient left ED via Discharge Method: ambulatory to Home with friend. Opportunity for questions and clarification provided. Patient given 1 scripts. To continue your aftercare when you leave the hospital, you may receive an automated call from our care team to check in on how you are doing. This is a free service and part of our promise to provide the best care and service to meet your aftercare needs.  If you have questions, or wish to unsubscribe from this service please call [phone]. Thank you for Choosing our [facility] Emergency Department.

## 2019-07-06 NOTE — DISCHARGE INSTRUCTIONS
Take medications as prescribed  Follow-up with a primary care physician  Return to the ER for any new or worsening symptoms    High Blood Pressure: Care Instructions  Overview    It's normal for blood pressure to go up and down throughout the day. But if it stays up, you have high blood pressure. Another name for high blood pressure is hypertension. Despite what a lot of people think, high blood pressure usually doesn't cause headaches or make you feel dizzy or lightheaded. It usually has no symptoms. But it does increase your risk of stroke, heart attack, and other problems. You and your doctor will talk about your risks of these problems based on your blood pressure. Your doctor will give you a goal for your blood pressure. Your goal will be based on your health and your age. Lifestyle changes, such as eating healthy and being active, are always important to help lower blood pressure. You might also take medicine to reach your blood pressure goal.  Follow-up care is a key part of your treatment and safety. Be sure to make and go to all appointments, and call your doctor if you are having problems. It's also a good idea to know your test results and keep a list of the medicines you take. How can you care for yourself at home? Medical treatment  · If you stop taking your medicine, your blood pressure will go back up. You may take one or more types of medicine to lower your blood pressure. Be safe with medicines. Take your medicine exactly as prescribed. Call your doctor if you think you are having a problem with your medicine. · Talk to your doctor before you start taking aspirin every day. Aspirin can help certain people lower their risk of a heart attack or stroke. But taking aspirin isn't right for everyone, because it can cause serious bleeding. · See your doctor regularly. You may need to see the doctor more often at first or until your blood pressure comes down.   · If you are taking blood pressure medicine, talk to your doctor before you take decongestants or anti-inflammatory medicine, such as ibuprofen. Some of these medicines can raise blood pressure. · Learn how to check your blood pressure at home. Lifestyle changes  · Stay at a healthy weight. This is especially important if you put on weight around the waist. Losing even 10 pounds can help you lower your blood pressure. · If your doctor recommends it, get more exercise. Walking is a good choice. Bit by bit, increase the amount you walk every day. Try for at least 30 minutes on most days of the week. You also may want to swim, bike, or do other activities. · Avoid or limit alcohol. Talk to your doctor about whether you can drink any alcohol. · Try to limit how much sodium you eat to less than 2,300 milligrams (mg) a day. Your doctor may ask you to try to eat less than 1,500 mg a day. · Eat plenty of fruits (such as bananas and oranges), vegetables, legumes, whole grains, and low-fat dairy products. · Lower the amount of saturated fat in your diet. Saturated fat is found in animal products such as milk, cheese, and meat. Limiting these foods may help you lose weight and also lower your risk for heart disease. · Do not smoke. Smoking increases your risk for heart attack and stroke. If you need help quitting, talk to your doctor about stop-smoking programs and medicines. These can increase your chances of quitting for good. When should you call for help? Call 911 anytime you think you may need emergency care. This may mean having symptoms that suggest that your blood pressure is causing a serious heart or blood vessel problem. Your blood pressure may be over 180/120.   For example, call 911 if:    · You have symptoms of a heart attack. These may include:  ? Chest pain or pressure, or a strange feeling in the chest.  ? Sweating. ? Shortness of breath. ? Nausea or vomiting.   ? Pain, pressure, or a strange feeling in the back, neck, jaw, or upper belly or in one or both shoulders or arms. ? Lightheadedness or sudden weakness. ? A fast or irregular heartbeat.     · You have symptoms of a stroke. These may include:  ? Sudden numbness, tingling, weakness, or loss of movement in your face, arm, or leg, especially on only one side of your body. ? Sudden vision changes. ? Sudden trouble speaking. ? Sudden confusion or trouble understanding simple statements. ? Sudden problems with walking or balance. ? A sudden, severe headache that is different from past headaches.     · You have severe back or belly pain.    Do not wait until your blood pressure comes down on its own. Get help right away.   Call your doctor now or seek immediate care if:    · Your blood pressure is much higher than normal (such as 180/120 or higher), but you don't have symptoms.     · You think high blood pressure is causing symptoms, such as:  ? Severe headache.  ? Blurry vision.    Watch closely for changes in your health, and be sure to contact your doctor if:    · Your blood pressure measures higher than your doctor recommends at least 2 times. That means the top number is higher or the bottom number is higher, or both.     · You think you may be having side effects from your blood pressure medicine. Where can you learn more? Go to http://edwige-avi.info/. Enter K687 in the search box to learn more about \"High Blood Pressure: Care Instructions. \"  Current as of: July 22, 2018  Content Version: 11.9  © 3876-4047 [employer]. Care instructions adapted under license by [employer] (which disclaims liability or warranty for this information). If you have questions about a medical condition or this instruction, always ask your healthcare professional. [place] [address] any warranty or liability for your use of this information.          Patient Education        Low Sodium Diet (2,000 Milligram): Care Instructions  Your Care Instructions    Too much sodium causes your body to hold on to extra water. This can raise your blood pressure and force your heart and kidneys to work harder. In very serious cases, this could cause you to be put in the hospital. It might even be life-threatening. By limiting sodium, you will feel better and lower your risk of serious problems. The most common source of sodium is salt. People get most of the salt in their diet from canned, prepared, and packaged foods. Fast food and restaurant meals also are very high in sodium. Your doctor will probably limit your sodium to less than 2,000 milligrams (mg) a day. This limit counts all the sodium in prepared and packaged foods and any salt you add to your food. Follow-up care is a key part of your treatment and safety. Be sure to make and go to all appointments, and call your doctor if you are having problems. It's also a good idea to know your test results and keep a list of the medicines you take. How can you care for yourself at home? Read food labels  · Read labels on cans and food packages. The labels tell you how much sodium is in each serving. Make sure that you look at the serving size. If you eat more than the serving size, you have eaten more sodium. · Food labels also tell you the Percent Daily Value for sodium. Choose products with low Percent Daily Values for sodium. · Be aware that sodium can come in forms other than salt, including monosodium glutamate (MSG), sodium citrate, and sodium bicarbonate (baking soda). MSG is often added to Asian food. When you eat out, you can sometimes ask for food without MSG or added salt. Buy low-sodium foods  · Buy foods that are labeled \"unsalted\" (no salt added), \"sodium-free\" (less than 5 mg of sodium per serving), or \"low-sodium\" (less than 140 mg of sodium per serving). Foods labeled \"reduced-sodium\" and \"light sodium\" may still have too much sodium.  Be sure to read the label to see how much sodium you are getting. · Buy fresh vegetables, or frozen vegetables without added sauces. Buy low-sodium versions of canned vegetables, soups, and other canned goods. Prepare low-sodium meals  · Cut back on the amount of salt you use in cooking. This will help you adjust to the taste. Do not add salt after cooking. One teaspoon of salt has about 2,300 mg of sodium. · Take the salt shaker off the table. · Flavor your food with garlic, lemon juice, onion, vinegar, herbs, and spices. Do not use soy sauce, lite soy sauce, steak sauce, onion salt, garlic salt, celery salt, mustard, or ketchup on your food. · Use low-sodium salad dressings, sauces, and ketchup. Or make your own salad dressings and sauces without adding salt. · Use less salt (or none) when recipes call for it. You can often use half the salt a recipe calls for without losing flavor. Other foods such as rice, pasta, and grains do not need added salt. · Rinse canned vegetables, and cook them in fresh water. This removes some--but not all--of the salt. · Avoid water that is naturally high in sodium or that has been treated with water softeners, which add sodium. Call your local water company to find out the sodium content of your water supply. If you buy bottled water, read the label and choose a sodium-free brand. Avoid high-sodium foods  · Avoid eating:  ? Smoked, cured, salted, and canned meat, fish, and poultry. ? Ham, olivo, hot dogs, and luncheon meats. ? Regular, hard, and processed cheese and regular peanut butter. ? Crackers with salted tops, and other salted snack foods such as pretzels, chips, and salted popcorn. ? Frozen prepared meals, unless labeled low-sodium. ? Canned and dried soups, broths, and bouillon, unless labeled sodium-free or low-sodium. ? Canned vegetables, unless labeled sodium-free or low-sodium. ? [place] [employer] fries, pizza, tacos, and other fast foods.   ? Pickles, olives, ketchup, and other condiments, especially soy sauce, unless labeled sodium-free or low-sodium. Where can you learn more? Go to http://edwige-avi.info/. Enter H351 in the search box to learn more about \"Low Sodium Diet (2,000 Milligram): Care Instructions. \"  Current as of: March 28, 2018  Content Version: 11.9  © 3410-7977 [employer]. Care instructions adapted under license by [employer] (which disclaims liability or warranty for this information). If you have questions about a medical condition or this instruction, always ask your healthcare professional. [place] [address] any warranty or liability for your use of this information.

## 2019-07-06 NOTE — ED PROVIDER NOTES
Patient presents to the ER complaining of elevated blood pressure. Reports he believes his blood pressures have been elevated for the past couple weeks. States he has been out of his blood pressure medicine for weeks as well. Reports he has had some recurrence of headache. Denies any chest pain or chest pressure. The history is provided by the patient. Hypertension    This is a recurrent problem. The current episode started more than 1 week ago. Associated symptoms include headaches. Pertinent negatives include no chest pain, no PND, no confusion, no peripheral edema, no dizziness and no shortness of breath. Past Medical History:   Diagnosis Date    Hypertension        Past Surgical History:   Procedure Laterality Date    VASCULAR SURGERY PROCEDURE UNLIST Left 2017    open great toe amputation    VASCULAR SURGERY PROCEDURE UNLIST Left 2017    revision&closure of great toe amp. No family history on file. Social History     Socioeconomic History    Marital status: SINGLE     Spouse name: Not on file    Number of children: Not on file    Years of education: Not on file    Highest education level: Not on file   Occupational History    Not on file   Social Needs    Financial resource strain: Not on file    Food insecurity:     Worry: Not on file     Inability: Not on file    Transportation needs:     Medical: Not on file     Non-medical: Not on file   Tobacco Use    Smoking status: Former Smoker     Last attempt to quit: 10/9/2017     Years since quittin.7    Smokeless tobacco: Never Used   Substance and Sexual Activity    Alcohol use:  Yes    Drug use: Yes     Types: Marijuana    Sexual activity: Not on file   Lifestyle    Physical activity:     Days per week: Not on file     Minutes per session: Not on file    Stress: Not on file   Relationships    Social connections:     Talks on phone: Not on file     Gets together: Not on file     Attends Tenriism service: Not on file     Active member of club or organization: Not on file     Attends meetings of clubs or organizations: Not on file     Relationship status: Not on file    Intimate partner violence:     Fear of current or ex partner: Not on file     Emotionally abused: Not on file     Physically abused: Not on file     Forced sexual activity: Not on file   Other Topics Concern    Not on file   Social History Narrative    Not on file         ALLERGIES: Patient has no known allergies. Review of Systems   Constitutional: Negative for fatigue, fever and unexpected weight change. HENT: Negative for congestion and dental problem. Eyes: Negative for photophobia, redness and visual disturbance. Respiratory: Negative for chest tightness and shortness of breath. Cardiovascular: Negative for chest pain and PND. Gastrointestinal: Negative for abdominal pain and anal bleeding. Endocrine: Negative for polydipsia, polyphagia and polyuria. Genitourinary: Negative for flank pain and frequency. Musculoskeletal: Negative for back pain. Skin: Negative for color change and pallor. Neurological: Positive for headaches. Negative for dizziness, seizures and speech difficulty. Hematological: Negative for adenopathy. Does not bruise/bleed easily. Psychiatric/Behavioral: Negative for behavioral problems and confusion. All other systems reviewed and are negative. Vitals:    07/05/19 1953 07/05/19 2129   BP: (!) 147/110 (!) 166/112   Pulse: 65    Resp: 16    Temp: 98.1 °F (36.7 °C)    SpO2: 98% 98%   Weight: 71.7 kg (158 lb)    Height: 6' (1.829 m)             Physical Exam   Constitutional: He is oriented to person, place, and time. He appears well-developed and well-nourished. HENT:   Head: Normocephalic and atraumatic. Eyes: Pupils are equal, round, and reactive to light. Conjunctivae and EOM are normal.   Cardiovascular: Normal rate and regular rhythm. Exam reveals no friction rub.    No murmur heard.  Pulmonary/Chest: Effort normal and breath sounds normal. No stridor. No respiratory distress. Abdominal: Soft. Bowel sounds are normal. He exhibits no distension. There is no tenderness. Musculoskeletal: Normal range of motion. He exhibits no edema or deformity. Neurological: He is alert and oriented to person, place, and time. No cranial nerve deficit. Coordination normal.   Nursing note and vitals reviewed. MDM  Number of Diagnoses or Management Options  Diagnosis management comments: Well appearing here. We will continue to monitor blood pressure, check basic labs including renal function numbers    9:56 PM  Labs fairly stable including normal blood cell counts and normal chemistry panel    Blood pressure remains moderately stable. Will discharge home .   We will give refill for amlodipine       Amount and/or Complexity of Data Reviewed  Clinical lab tests: reviewed and ordered    Risk of Complications, Morbidity, and/or Mortality  Presenting problems: moderate  Diagnostic procedures: low  Management options: moderate    Patient Progress  Patient progress: stable         Procedures      Results Include:    Recent Results (from the past 24 hour(s))   EKG, 12 LEAD, INITIAL    Collection Time: 07/05/19  7:55 PM   Result Value Ref Range    Ventricular Rate 64 BPM    Atrial Rate 64 BPM    P-R Interval 146 ms    QRS Duration 88 ms    Q-T Interval 448 ms    QTC Calculation (Bezet) 462 ms    Calculated P Axis 73 degrees    Calculated R Axis 21 degrees    Calculated T Axis 16 degrees    Diagnosis       Normal sinus rhythm  Possible Left atrial enlargement  Borderline ECG  No previous ECGs available     CBC WITH AUTOMATED DIFF    Collection Time: 07/05/19  8:01 PM   Result Value Ref Range    WBC 5.0 4.3 - 11.1 K/uL    RBC 5.75 (H) 4.23 - 5.6 M/uL    HGB 18.0 (H) 13.6 - 17.2 g/dL    HCT 52.5 (H) 41.1 - 50.3 %    MCV 91.3 79.6 - 97.8 FL    MCH 31.3 26.1 - 32.9 PG    MCHC 34.3 31.4 - 35.0 g/dL    RDW 13.5 11.9 - 14.6 %    PLATELET 306 714 - 084 K/uL    MPV 9.6 9.4 - 12.3 FL    ABSOLUTE NRBC 0.00 0.0 - 0.2 K/uL    DF AUTOMATED      NEUTROPHILS 64 43 - 78 %    LYMPHOCYTES 24 13 - 44 %    MONOCYTES 9 4.0 - 12.0 %    EOSINOPHILS 2 0.5 - 7.8 %    BASOPHILS 1 0.0 - 2.0 %    IMMATURE GRANULOCYTES 0 0.0 - 5.0 %    ABS. NEUTROPHILS 3.2 1.7 - 8.2 K/UL    ABS. LYMPHOCYTES 1.2 0.5 - 4.6 K/UL    ABS. MONOCYTES 0.5 0.1 - 1.3 K/UL    ABS. EOSINOPHILS 0.1 0.0 - 0.8 K/UL    ABS. BASOPHILS 0.0 0.0 - 0.2 K/UL    ABS. IMM. GRANS. 0.0 0.0 - 0.5 K/UL   METABOLIC PANEL, COMPREHENSIVE    Collection Time: 07/05/19  8:01 PM   Result Value Ref Range    Sodium 142 136 - 145 mmol/L    Potassium 3.5 3.5 - 5.1 mmol/L    Chloride 106 98 - 107 mmol/L    CO2 30 21 - 32 mmol/L    Anion gap 6 (L) 7 - 16 mmol/L    Glucose 101 (H) 65 - 100 mg/dL    BUN 9 6 - 23 MG/DL    Creatinine 0.95 0.8 - 1.5 MG/DL    GFR est AA >60 >60 ml/min/1.73m2    GFR est non-AA >60 >60 ml/min/1.73m2    Calcium 9.6 8.3 - 10.4 MG/DL    Bilirubin, total 0.6 0.2 - 1.1 MG/DL    ALT (SGPT) 14 12 - 65 U/L    AST (SGOT) 12 (L) 15 - 37 U/L    Alk. phosphatase 113 50 - 136 U/L    Protein, total 7.7 6.3 - 8.2 g/dL    Albumin 3.5 3.5 - 5.0 g/dL    Globulin 4.2 (H) 2.3 - 3.5 g/dL    A-G Ratio 0.8 (L) 1.2 - 3.5       Voice dictation software was used during the making of this note. This software is not perfect and grammatical and other typographical errors may be present. This note has been proofread, but may still contain errors.   Kavitha Wood MD; 7/5/2019 @9:57 PM   ===================================================================

## 2022-02-14 ENCOUNTER — APPOINTMENT (OUTPATIENT)
Dept: GENERAL RADIOLOGY | Age: 62
End: 2022-02-14
Attending: PHYSICIAN ASSISTANT
Payer: MEDICARE

## 2022-02-14 ENCOUNTER — HOSPITAL ENCOUNTER (EMERGENCY)
Age: 62
Discharge: HOME | End: 2022-02-14
Attending: EMERGENCY MEDICINE
Payer: MEDICARE

## 2022-02-14 VITALS
OXYGEN SATURATION: 97 % | HEART RATE: 81 BPM | WEIGHT: 178 LBS | DIASTOLIC BLOOD PRESSURE: 121 MMHG | SYSTOLIC BLOOD PRESSURE: 168 MMHG | RESPIRATION RATE: 16 BRPM | TEMPERATURE: 98 F | HEIGHT: 73 IN | BODY MASS INDEX: 23.59 KG/M2

## 2022-02-14 DIAGNOSIS — B35.3 TINEA PEDIS OF RIGHT FOOT: Primary | ICD-10-CM

## 2022-02-14 DIAGNOSIS — B35.1 ONYCHOMYCOSIS OF TOENAIL: ICD-10-CM

## 2022-02-14 LAB
ALBUMIN SERPL-MCNC: 3.5 G/DL (ref 3.2–4.6)
ALBUMIN/GLOB SERPL: 0.7 {RATIO} (ref 1.2–3.5)
ALP SERPL-CCNC: 99 U/L (ref 50–136)
ALT SERPL-CCNC: 15 U/L (ref 12–65)
ANION GAP SERPL CALC-SCNC: 6 MMOL/L (ref 7–16)
AST SERPL-CCNC: 12 U/L (ref 15–37)
BASOPHILS # BLD: 0 K/UL (ref 0–0.2)
BASOPHILS NFR BLD: 0 % (ref 0–2)
BILIRUB SERPL-MCNC: 0.5 MG/DL (ref 0.2–1.1)
BUN SERPL-MCNC: 14 MG/DL (ref 8–23)
CALCIUM SERPL-MCNC: 9.7 MG/DL (ref 8.3–10.4)
CHLORIDE SERPL-SCNC: 106 MMOL/L (ref 98–107)
CO2 SERPL-SCNC: 27 MMOL/L (ref 21–32)
CREAT SERPL-MCNC: 0.9 MG/DL (ref 0.8–1.5)
DIFFERENTIAL METHOD BLD: ABNORMAL
EOSINOPHIL # BLD: 0.1 K/UL (ref 0–0.8)
EOSINOPHIL NFR BLD: 1 % (ref 0.5–7.8)
ERYTHROCYTE [DISTWIDTH] IN BLOOD BY AUTOMATED COUNT: 14.2 % (ref 11.9–14.6)
GLOBULIN SER CALC-MCNC: 4.8 G/DL (ref 2.3–3.5)
GLUCOSE SERPL-MCNC: 101 MG/DL (ref 65–100)
HCT VFR BLD AUTO: 53.4 % (ref 41.1–50.3)
HGB BLD-MCNC: 17.9 G/DL (ref 13.6–17.2)
IMM GRANULOCYTES # BLD AUTO: 0 K/UL (ref 0–0.5)
IMM GRANULOCYTES NFR BLD AUTO: 0 % (ref 0–5)
LYMPHOCYTES # BLD: 1.3 K/UL (ref 0.5–4.6)
LYMPHOCYTES NFR BLD: 18 % (ref 13–44)
MCH RBC QN AUTO: 31.4 PG (ref 26.1–32.9)
MCHC RBC AUTO-ENTMCNC: 33.5 G/DL (ref 31.4–35)
MCV RBC AUTO: 93.7 FL (ref 79.6–97.8)
MONOCYTES # BLD: 0.7 K/UL (ref 0.1–1.3)
MONOCYTES NFR BLD: 9 % (ref 4–12)
NEUTS SEG # BLD: 5.4 K/UL (ref 1.7–8.2)
NEUTS SEG NFR BLD: 72 % (ref 43–78)
NRBC # BLD: 0 K/UL (ref 0–0.2)
PLATELET # BLD AUTO: 284 K/UL (ref 150–450)
PMV BLD AUTO: 9.5 FL (ref 9.4–12.3)
POTASSIUM SERPL-SCNC: 3.7 MMOL/L (ref 3.5–5.1)
PROT SERPL-MCNC: 8.3 G/DL (ref 6.3–8.2)
RBC # BLD AUTO: 5.7 M/UL (ref 4.23–5.6)
SODIUM SERPL-SCNC: 139 MMOL/L (ref 138–145)
WBC # BLD AUTO: 7.5 K/UL (ref 4.3–11.1)

## 2022-02-14 PROCEDURE — 99284 EMERGENCY DEPT VISIT MOD MDM: CPT

## 2022-02-14 PROCEDURE — 85025 COMPLETE CBC W/AUTO DIFF WBC: CPT

## 2022-02-14 PROCEDURE — 80053 COMPREHEN METABOLIC PANEL: CPT

## 2022-02-14 PROCEDURE — 73630 X-RAY EXAM OF FOOT: CPT

## 2022-02-14 RX ORDER — CHLORPHENIRAMINE MALEATE 4 MG
TABLET ORAL 2 TIMES DAILY
Qty: 12 G | Refills: 0 | Status: SHIPPED | OUTPATIENT
Start: 2022-02-14 | End: 2022-03-16

## 2022-02-14 NOTE — ED TRIAGE NOTES
Patient arrives ambulatory to triage with mask in place. Patient reports he needs his right fifth toe removed. Reports he is not diabetic, has \"poor circulation. \"  Patient reports he has already had a toe removed on the left foot.

## 2022-02-14 NOTE — DISCHARGE INSTRUCTIONS
Your labs and xray were reassuring today. Use the antifungal ointment in the web spaces of your foot to treat with fungal infection. It can take up to 4 weeks for these symptoms to resolve. As we discussed, schedule an appointment with the Podiatrist for definitive management and ongoing treatment. Follow up with your PCP in the next 1-2 days if no improvement. Return to the ER for any new or worsening symptoms.

## 2022-02-14 NOTE — ED NOTES
I have reviewed discharge instructions with the patient. The patient verbalized understanding. Patient left ED via Discharge Method: ambulatory to Home with self. Opportunity for questions and clarification provided. Patient given 1 scripts. To continue your aftercare when you leave the hospital, you may receive an automated call from our care team to check in on how you are doing. This is a free service and part of our promise to provide the best care and service to meet your aftercare needs.  If you have questions, or wish to unsubscribe from this service please call [phone]. Thank you for Choosing our [facility] Emergency Department.

## 2022-02-14 NOTE — ED PROVIDER NOTES
81-year-old well-appearing male presents today for evaluation of right little toe pain. Patient is concerned for decreased circulation. Patient states he had to have a toe amputated on his left foot and therefore has become more concerned about any ailments in his right foot. He reports underlying peripheral vascular disease of bilateral lower extremities secondary to tobacco use. He states that he has had some right little toe pain over the last week. He denies any numbness or tingling in the extremity. No swelling of the toe or foot, no associated red streaking, fevers, chills. Denies any associated open wounds. Patient is not diabetic. He does not follow with podiatry. No recent hospitalizations or surgeries. No past medical history on file. No past surgical history on file. No family history on file. Social History     Socioeconomic History    Marital status: SINGLE     Spouse name: Not on file    Number of children: Not on file    Years of education: Not on file    Highest education level: Not on file   Occupational History    Not on file   Tobacco Use    Smoking status: Not on file    Smokeless tobacco: Not on file   Substance and Sexual Activity    Alcohol use: Not on file    Drug use: Not on file    Sexual activity: Not on file   Other Topics Concern    Not on file   Social History Narrative    Not on file     Social Determinants of Health     Financial Resource Strain:     Difficulty of Paying Living Expenses: Not on file   Food Insecurity:     Worried About Running Out of Food in the Last Year: Not on file    Cesar of Food in the Last Year: Not on file   Transportation Needs:     Lack of Transportation (Medical): Not on file    Lack of Transportation (Non-Medical):  Not on file   Physical Activity:     Days of Exercise per Week: Not on file    Minutes of Exercise per Session: Not on file   Stress:     Feeling of Stress : Not on file   Social Connections:  Frequency of Communication with Friends and Family: Not on file    Frequency of Social Gatherings with Friends and Family: Not on file    Attends Restorationism Services: Not on file    Active Member of Clubs or Organizations: Not on file    Attends Club or Organization Meetings: Not on file    Marital Status: Not on file   Intimate Partner Violence:     Fear of Current or Ex-Partner: Not on file    Emotionally Abused: Not on file    Physically Abused: Not on file    Sexually Abused: Not on file   Housing Stability:     Unable to Pay for Housing in the Last Year: Not on file    Number of [place] in the Last Year: Not on file    Unstable Housing in the Last Year: Not on file         ALLERGIES: Patient has no known allergies. Review of Systems   Constitutional: Negative for activity change, chills and fatigue. Respiratory: Negative for cough and shortness of breath. Cardiovascular: Negative for leg swelling. Gastrointestinal: Negative for abdominal pain, nausea and vomiting. Genitourinary: Negative for dysuria and hematuria. Musculoskeletal: Negative for back pain and myalgias. Right little toe pain   Skin: Negative for color change. Neurological: Negative for weakness, numbness and headaches. Psychiatric/Behavioral: Negative for confusion. Vitals:    02/14/22 1334   BP: (!) 171/119   Pulse: 89   Resp: 16   Temp: 97.7 °F (36.5 °C)   SpO2: 100%   Weight: 80.7 kg (178 lb)   Height: 6' 1\" (1.854 m)            Physical Exam  Vitals and nursing note reviewed. Constitutional:       General: He is not in acute distress. Appearance: Normal appearance. HENT:      Head: Normocephalic and atraumatic. Eyes:      Conjunctiva/sclera: Conjunctivae normal.   Cardiovascular:      Rate and Rhythm: Normal rate and regular rhythm. Pulses:           Dorsalis pedis pulses are 2+ on the right side. Posterior tibial pulses are 2+ on the right side. Heart sounds:  No murmur heard. No friction rub. No gallop. Pulmonary:      Effort: Pulmonary effort is normal.      Breath sounds: No wheezing, rhonchi or rales. Musculoskeletal:        Feet:    Feet:      Right foot:      Skin integrity: Callus and dry skin present. No blister, skin breakdown, erythema or warmth. Toenail Condition: Right toenails are abnormally thick. Fungal disease present. Skin:     General: Skin is warm and dry. Capillary Refill: Capillary refill takes less than 2 seconds. Neurological:      Mental Status: He is alert and oriented to person, place, and time. Gait: Gait normal.   Psychiatric:         Mood and Affect: Mood normal.         Thought Content: Thought content normal.         Judgment: Judgment normal.          MDM  Number of Diagnoses or Management Options  Onychomycosis of toenail  Tinea pedis of right foot  Diagnosis management comments: DDx: Cellulitis, tinea pedis, onychomycosis, PAD, osteomyelitis, avascular necrosis, diabetic foot wound    In summary this is a well-appearing 81-year-old male presenting today for right little toe pain. Patient reports history of chronic peripheral vascular disease which did result in amputation of a toe on his left foot which is why he was concerned for his right toe pain today. He denies any fevers, swelling, or discoloration. On physical exam there is chronic onychomycosis of all toenails of the right foot, signs of tinea pedis in the webspaces of the toes, but no open wounds, warmth, erythema or swelling. Palpable pulses and normal capillary refill. Labs are reassuring today. X-ray is negative for any signs of osteomyelitis. Will have patient follow-up with podiatry, will treat for tinea pedis with antifungal cream.    Discussed all lab and imaging results with patient and/or family.  I discussed my recommendations and treatment plan with the patient and/or family using shared medical decision making they are in agreement with this plan. All medications if prescribed were discussed and possible adverse side effects discussed. Patient and/or family were provided an opportunity to ask questions. They were educated on signs/symptoms that would warrant emergent re-evaluation in the ER and they verbalized understanding. Jasiel Barbosa; 2/14/2022 @4:42 PM Voice dictation software was used during the making of this note. This software is not perfect and grammatical and other typographical errors may be present. This note has not been proofread for errors.  ====================================    ====================================         Amount and/or Complexity of Data Reviewed  Clinical lab tests: ordered and reviewed  Tests in the radiology section of CPT®: ordered and reviewed    Patient Progress  Patient progress: stable    ED Course as of 02/14/22 1639   Mon Feb 14, 2022   1636 EXAM: 3 views of the right foot. INDICATION: Right fifth toe pain. COMPARISON: None.     FINDINGS:   No evidence of fracture or dislocation. Joint spaces are preserved. Soft tissues  are unremarkable. Bone mineralization is normal. Chronic ossification the region  of the distal     IMPRESSION  1.  No evidence of acute fracture or dislocation.    [KE]      ED Course User Index  [KE] DELIO Brito       Procedures

## 2022-02-16 NOTE — PROGRESS NOTES
[address]                                                                                                 PATIENT INFORMATION   Patient Name: Rogers Santiago: [de-identified] Patient MRN: [id number]   Address: [address]  Patient CSN: [id number]      Mosque: Judaism   Sex: Male Marital Status: SINGLE   : 1960 Age:   62 yrs   Home Phone: [phone]  Mobile Phone:   [phone]        Race: BLACK/ Employer:   Not Employed   Language: ENGLISH Admitted/Arrived From:   Home [347]   ADMISSION INFORMATION   Admit Date: 2022 Admit Time:  2:28 PM   Patient Class: Emergency Service: Emergency   Admit Source: Other type of health car* Admit Type: EMERGENCY   Admitting Provider:   Attending Provider:     Unit: [facility] Emergency Dept Room/Bed: ER26/26    Admission Diagnosis:  and codes:      Emergency Complaint: Toe problem                Discharge Date: 2022 Discharge Time:  5:08 PM    GUARANTOR INFORMATION   Name:  Ramon Javier Address: [address] [place] [address]  Rel:  Self   Phone: [address]  : 1960   EMERGENCY CONTACTS   Name: Madeleine Davis:  Mobile:    [phone] Rel: Life Partner   [address] INFORMATION   Primary Insurance:   [address]: Jade Sails   Plan Name: [address] [place] [address] Medicare Hmo Pt Rel to Subscriber: Self [01]   Claim Address: [address] [place] [address] Sex: Male      Policy #:  [id number]    Group #: [id number] Group Name:       Auth #: N/A Ins Phone:         Secondary Insurance: MEDICAID OF [facility]* Subscriber: Jade Sails   Plan Name: [id number] [address] Pt Rel to Subscriber: Self   Claim Address: [address] Sex:  Male     Policy #: [id number]    Group #:   Group Name:     Auth #: N/A Ins Phone:         Accident Date:    Accident Type:     PROVIDER INFORMATION   PCP:         None PCP Phone:  None   Referring Prov: No ref.  provider found Referring Phone:  Referring Fax:  N/A      Advanced Directive:  Not Received Research:     Lab Client:   Enrollment Status:              Printed on 2/16/22  2:00 PM Page          Referral sent to Podiatry [phone]  Fax sent to [phone]

## 2022-02-21 NOTE — PROGRESS NOTES
Call received from pt about podiatry referral, made pt aware referral was made on 2/16/22, asked pt about contact information in chart and am told phone number in incorrect in chart, correct contact number for pt is [phone], contact phone number for office provided to pt and instructed to contact them for follow up. Pt verbalized understanding.

## 2022-05-16 ENCOUNTER — HOSPITAL ENCOUNTER (EMERGENCY)
Age: 62
Discharge: HOME | End: 2022-05-16
Attending: EMERGENCY MEDICINE
Payer: MEDICARE

## 2022-05-16 ENCOUNTER — APPOINTMENT (OUTPATIENT)
Dept: GENERAL RADIOLOGY | Age: 62
End: 2022-05-16
Attending: PHYSICIAN ASSISTANT
Payer: MEDICARE

## 2022-05-16 VITALS
SYSTOLIC BLOOD PRESSURE: 124 MMHG | TEMPERATURE: 98 F | HEART RATE: 76 BPM | OXYGEN SATURATION: 100 % | RESPIRATION RATE: 16 BRPM | DIASTOLIC BLOOD PRESSURE: 96 MMHG

## 2022-05-16 DIAGNOSIS — S61.213A LACERATION OF LEFT MIDDLE FINGER WITHOUT FOREIGN BODY WITHOUT DAMAGE TO NAIL, INITIAL ENCOUNTER: Primary | ICD-10-CM

## 2022-05-16 PROCEDURE — 73140 X-RAY EXAM OF FINGER(S): CPT

## 2022-05-16 PROCEDURE — 99283 EMERGENCY DEPT VISIT LOW MDM: CPT

## 2022-05-16 RX ORDER — DOXYCYCLINE HYCLATE 100 MG
100 TABLET ORAL 2 TIMES DAILY
Qty: 20 TABLET | Refills: 0 | Status: SHIPPED | OUTPATIENT
Start: 2022-05-16 | End: 2022-05-26

## 2022-05-16 NOTE — ED PROVIDER NOTES
Patient presents to the emergency department due to an open wound to the palmar aspect of his left third finger. He states yesterday evening he was walking his dog and he was holding his metal chain when the dog lunged forward and the chain was wrapped around his hand and lacerated the palmar aspect of the left third proximal phalanx. Patient sustained a oblique 2 cm laceration. Patient initially thought that his tetanus was not up-to-date however our pharmacist does see he was given 1 back in 2018 and will not need 1 today. Patient was concerned about the open wound today and some increased pain. I did explain to him that since this has been over 24 hours due to risk of infection I would recommend not trying to suture the wound as he already is having some drainage from the wound and I am worried it is getting infected. X-ray will be obtained to rule out underlying fracture. Past Medical History:   Diagnosis Date    Hypertension        Past Surgical History:   Procedure Laterality Date    VASCULAR SURGERY PROCEDURE UNLIST Left 2017    open great toe amputation    VASCULAR SURGERY PROCEDURE UNLIST Left 2017    revision&closure of great toe amp. No family history on file. Social History     Socioeconomic History    Marital status: SINGLE     Spouse name: Not on file    Number of children: Not on file    Years of education: Not on file    Highest education level: Not on file   Occupational History    Not on file   Tobacco Use    Smoking status: Former Smoker     Quit date: 10/9/2017     Years since quittin.6    Smokeless tobacco: Never Used   Substance and Sexual Activity    Alcohol use:  Yes    Drug use: Yes     Types: Marijuana    Sexual activity: Not on file   Other Topics Concern    Not on file   Social History Narrative    Not on file     Social Determinants of Health     Financial Resource Strain:     Difficulty of Paying Living Expenses: Not on file Food Insecurity:     Worried About Running Out of Food in the Last Year: Not on file    Hawk of Food in the Last Year: Not on file   Transportation Needs:     Lack of Transportation (Medical): Not on file    Lack of Transportation (Non-Medical): Not on file   Physical Activity:     Days of Exercise per Week: Not on file    Minutes of Exercise per Session: Not on file   Stress:     Feeling of Stress : Not on file   Social Connections:     Frequency of Communication with Friends and Family: Not on file    Frequency of Social Gatherings with Friends and Family: Not on file    Attends Bahai Services: Not on file    Active Member of [address] [place] [address] [employer] or Organizations: Not on file    Attends Club or Organization Meetings: Not on file    Marital Status: Not on file   Intimate Partner Violence:     Fear of Current or Ex-Partner: Not on file    Emotionally Abused: Not on file    Physically Abused: Not on file    Sexually Abused: Not on file   Housing Stability:     Unable to Pay for Housing in the Last Year: Not on file    Number of [place] in the Last Year: Not on file    Unstable Housing in the Last Year: Not on file         ALLERGIES: Patient has no known allergies. Review of Systems   Musculoskeletal:        Pain to the left third digit and laceration overlying the palmar aspect of the left third digit   All other systems reviewed and are negative. Vitals:    05/16/22 1028   BP: (!) 132/99   Pulse: 70   Resp: 20   Temp: 98.4 °F (36.9 °C)   SpO2: 100%            Physical Exam  Vitals and nursing note reviewed. Constitutional:       Appearance: Normal appearance. HENT:      Head: Normocephalic and atraumatic. Nose: Nose normal.      Mouth/Throat:      Mouth: Mucous membranes are moist.   Eyes:      Extraocular Movements: Extraocular movements intact. Pupils: Pupils are equal, round, and reactive to light. Cardiovascular:      Rate and Rhythm: Normal rate and regular rhythm. Pulses: Normal pulses. Heart sounds: Normal heart sounds. Pulmonary:      Effort: Pulmonary effort is normal.      Breath sounds: Normal breath sounds. Musculoskeletal:         General: Normal range of motion. Cervical back: Normal range of motion and neck supple. Comments: Patient has tenderness to palpation of the left third proximal phalanx. There is an oblique 2 cm open wound to the palmar aspect of the left third proximal phalanx. There is some scant yellow drainage but no surrounding cellulitis. Wound is too old to suture or close it is been approximately 26+ hours from initial injury   Skin:     General: Skin is warm and dry. Capillary Refill: Capillary refill takes less than 2 seconds. Neurological:      General: No focal deficit present. Mental Status: He is alert. Psychiatric:         Mood and Affect: Mood normal.         Behavior: Behavior normal.         Thought Content: Thought content normal.          MDM  Number of Diagnoses or Management Options  Laceration of left middle finger without foreign body without damage to nail, initial encounter  Diagnosis management comments: Differential diagnoses: Finger fracture, finger laceration, infected wound    Patient's x-ray does not show any acute fracture. Records were reviewed and patient is actually up-to-date on his tetanus and received 1 in 2018. Wound was cleaned and a nonadherent dressing was placed over the wound as it is too old to suture closed. I am worried about potential infection as he is beginning to develop whitish-yellow drainage in the wound and I will cover him with Doxy. He can follow-up with hand surgery Dr. Kayode Mckoy for any worsening symptoms. I do not visualize any tendon and there is no deficit in flexion of the finger and no paresthesias.     Unable to upload a photo of the patient's wound into the chart as epic haiku is malfunctioning at this time       Amount and/or Complexity of Data Reviewed  Tests in the radiology section of CPT®: reviewed    Risk of Complications, Morbidity, and/or Mortality  Presenting problems: low  Diagnostic procedures: low  Management options: low    Patient Progress  Patient progress: improved         Procedures

## 2022-05-16 NOTE — ED NOTES
I have reviewed discharge instructions with the patient. The patient verbalized understanding. Patient left ED via Discharge Method: ambulatory to Home with self. Opportunity for questions and clarification provided. Patient given 1 scripts. To continue your aftercare when you leave the hospital, you may receive an automated call from our care team to check in on how you are doing. This is a free service and part of our promise to provide the best care and service to meet your aftercare needs.  If you have questions, or wish to unsubscribe from this service please call [phone]. Thank you for Choosing our [facility] Emergency Department.

## 2022-05-16 NOTE — Clinical Note
[address] [facility] EMERGENCY DEPT   [facility]  [address] [place] [address] [phone]    Work/School Note    Date: 5/16/2022    To Whom It May concern: Diaz Watson was seen and treated today in the emergency room by the following provider(s):  Attending Provider: Pablo Bull MD  Physician Assistant: LIA Rivera. Diaz Watson is excused from work/school on 5/16/2022 through 5/18/2022. He is medically clear to return to work/school on 5/19/2022.          Sincerely,          LIA Cramer

## 2022-05-16 NOTE — ED TRIAGE NOTES
Pt arrives ambulatory masked stating left sided middle finger laceration. Pt got this yesterday on a dog chain. Pt is able to move fingers. Pt has 2 plus pulses in left hand.  Pt states last tetnus has been  Within the last 10 years

## 2022-05-16 NOTE — DISCHARGE INSTRUCTIONS
Because your injury occurred over 24 hours ago the risk of infection is too high to suture at this time. Please start doxycycline today to help prevent infection. No evidence of underlying fracture.   If you have any concerns about the wound or worsening pain in the finger you can contact the orthopedic hand surgeon Dr. Caitlin Clnacy to schedule follow-up appointment this week    You can take up to 600 mg of ibuprofen every 8 hours as needed for pain if you are not still taking the prescription diclofenac which is also called Cataflam
